# Patient Record
Sex: MALE | Race: WHITE | NOT HISPANIC OR LATINO
[De-identification: names, ages, dates, MRNs, and addresses within clinical notes are randomized per-mention and may not be internally consistent; named-entity substitution may affect disease eponyms.]

---

## 2017-01-03 ENCOUNTER — APPOINTMENT (OUTPATIENT)
Dept: CARDIOLOGY | Facility: CLINIC | Age: 62
End: 2017-01-03

## 2017-01-26 ENCOUNTER — APPOINTMENT (OUTPATIENT)
Dept: CARDIOLOGY | Facility: CLINIC | Age: 62
End: 2017-01-26

## 2017-01-30 ENCOUNTER — APPOINTMENT (OUTPATIENT)
Dept: CARDIOLOGY | Facility: CLINIC | Age: 62
End: 2017-01-30

## 2017-02-15 ENCOUNTER — EMERGENCY (EMERGENCY)
Facility: HOSPITAL | Age: 62
LOS: 1 days | End: 2017-02-15
Payer: COMMERCIAL

## 2017-02-15 PROCEDURE — 71010: CPT | Mod: 26

## 2017-02-15 PROCEDURE — 27786 TREATMENT OF ANKLE FRACTURE: CPT | Mod: 54

## 2017-02-15 PROCEDURE — 73610 X-RAY EXAM OF ANKLE: CPT | Mod: 26,LT

## 2017-02-15 PROCEDURE — 73630 X-RAY EXAM OF FOOT: CPT | Mod: 26,LT

## 2017-02-15 PROCEDURE — 99284 EMERGENCY DEPT VISIT MOD MDM: CPT | Mod: 25

## 2017-04-04 ENCOUNTER — MEDICATION RENEWAL (OUTPATIENT)
Age: 62
End: 2017-04-04

## 2017-04-28 ENCOUNTER — OUTPATIENT (OUTPATIENT)
Dept: OUTPATIENT SERVICES | Facility: HOSPITAL | Age: 62
LOS: 1 days | End: 2017-04-28

## 2017-07-25 ENCOUNTER — RX RENEWAL (OUTPATIENT)
Age: 62
End: 2017-07-25

## 2017-08-14 ENCOUNTER — APPOINTMENT (OUTPATIENT)
Dept: CARDIOLOGY | Facility: CLINIC | Age: 62
End: 2017-08-14

## 2017-11-17 ENCOUNTER — RX RENEWAL (OUTPATIENT)
Age: 62
End: 2017-11-17

## 2018-06-12 ENCOUNTER — RX RENEWAL (OUTPATIENT)
Age: 63
End: 2018-06-12

## 2018-11-26 ENCOUNTER — OUTPATIENT (OUTPATIENT)
Dept: OUTPATIENT SERVICES | Facility: HOSPITAL | Age: 63
LOS: 1 days | End: 2018-11-26
Payer: COMMERCIAL

## 2018-11-26 PROCEDURE — 70553 MRI BRAIN STEM W/O & W/DYE: CPT | Mod: 26

## 2018-12-26 ENCOUNTER — APPOINTMENT (OUTPATIENT)
Dept: CARDIOLOGY | Facility: CLINIC | Age: 63
End: 2018-12-26

## 2018-12-26 DIAGNOSIS — H91.92 UNSPECIFIED HEARING LOSS, LEFT EAR: ICD-10-CM

## 2018-12-26 DIAGNOSIS — Z86.61 PERSONAL HISTORY OF INFECTIONS OF THE CENTRAL NERVOUS SYSTEM: ICD-10-CM

## 2018-12-26 DIAGNOSIS — Z82.3 FAMILY HISTORY OF STROKE: ICD-10-CM

## 2018-12-26 DIAGNOSIS — Z82.49 FAMILY HISTORY OF ISCHEMIC HEART DISEASE AND OTHER DISEASES OF THE CIRCULATORY SYSTEM: ICD-10-CM

## 2018-12-26 DIAGNOSIS — Z78.9 OTHER SPECIFIED HEALTH STATUS: ICD-10-CM

## 2018-12-26 DIAGNOSIS — Z87.891 PERSONAL HISTORY OF NICOTINE DEPENDENCE: ICD-10-CM

## 2018-12-31 ENCOUNTER — APPOINTMENT (OUTPATIENT)
Dept: CARDIOLOGY | Facility: CLINIC | Age: 63
End: 2018-12-31
Payer: COMMERCIAL

## 2018-12-31 ENCOUNTER — NON-APPOINTMENT (OUTPATIENT)
Age: 63
End: 2018-12-31

## 2018-12-31 VITALS
BODY MASS INDEX: 34.1 KG/M2 | WEIGHT: 225 LBS | RESPIRATION RATE: 14 BRPM | OXYGEN SATURATION: 95 % | HEIGHT: 68 IN | DIASTOLIC BLOOD PRESSURE: 80 MMHG | HEART RATE: 82 BPM | SYSTOLIC BLOOD PRESSURE: 120 MMHG

## 2018-12-31 PROCEDURE — 99213 OFFICE O/P EST LOW 20 MIN: CPT

## 2018-12-31 PROCEDURE — 93000 ELECTROCARDIOGRAM COMPLETE: CPT

## 2018-12-31 NOTE — REVIEW OF SYSTEMS
[Feeling Fatigued] : feeling fatigued [Heartburn] : heartburn [see HPI] : see HPI [Dizziness] : dizziness [Negative] : Heme/Lymph

## 2019-01-03 NOTE — ASSESSMENT
[FreeTextEntry1] : To review, Camden is a pleasant 63-year-old male with the following active issues.\par Diabetes mellitus: Reports last hemoglobin A1c was 6.7. Follow with PMD.\par Dizziness: 24 hour Holter monitor to ensure no significant arrhythmias.  \par Fatigue: Echocardiogram to assure no significant cardiomyopathy or worsening cardiomyopathy. Patient also has auscultated systolic murmur.\par Exercise stress test to evaluate for any exercise-induced arrhythmias with history of dizziness.\par Obesity: Lifestyle measures to include increasing exercise and decreasing caloric intake discussed.

## 2019-01-03 NOTE — REASON FOR VISIT
[Follow-Up - Clinic] : a clinic follow-up of [FreeTextEntry2] : CV evaluation for fatigue and dizziness.  [FreeTextEntry1] : Camden is a very pleasant 63-year-old male that presents today for yearly cardiovascular followup. Since last being seen has been no hospitalizations. Recently started on Janumet and Farxiga for elevated hemoglobin A1c.  Recent labs by PMD are not available for me to review at this time.\par States episodes of significant fatigue and dizziness several weeks ago. Lasted for approximately 1-1/2 weeks. Constant. Denies any other symptoms to include chest pain, shortness of breath, palpitations, syncope or near syncope. Denies any edema, PND, or orthopnea.\par He does have a history of mild TR.  Denies edema.\par Patient states that he is minimally active. He does not exercise. He is active with ADLs but does not exert himself.\par Patient brought his list of medications, does not list any dosages. Advised patient he needs to bring all his meds to next office visit.\par

## 2019-01-03 NOTE — PHYSICAL EXAM
[General Appearance - Well Developed] : well developed [Normal Appearance] : normal appearance [Well Groomed] : well groomed [General Appearance - Well Nourished] : well nourished [No Deformities] : no deformities [General Appearance - In No Acute Distress] : no acute distress [Normal Conjunctiva] : the conjunctiva exhibited no abnormalities [Eyelids - No Xanthelasma] : the eyelids demonstrated no xanthelasmas [No Oral Pallor] : no oral pallor [No Oral Cyanosis] : no oral cyanosis [Normal Jugular Venous A Waves Present] : normal jugular venous A waves present [Normal Jugular Venous V Waves Present] : normal jugular venous V waves present [No Jugular Venous Irving A Waves] : no jugular venous irving A waves [Respiration, Rhythm And Depth] : normal respiratory rhythm and effort [Exaggerated Use Of Accessory Muscles For Inspiration] : no accessory muscle use [Auscultation Breath Sounds / Voice Sounds] : lungs were clear to auscultation bilaterally [Heart Rate And Rhythm] : heart rate and rhythm were normal [Heart Sounds] : normal S1 and S2 [Abdomen Soft] : soft [Abnormal Walk] : normal gait [Gait - Sufficient For Exercise Testing] : the gait was sufficient for exercise testing [Nail Clubbing] : no clubbing of the fingernails [Cyanosis, Localized] : no localized cyanosis [Petechial Hemorrhages (___cm)] : no petechial hemorrhages [Skin Color & Pigmentation] : normal skin color and pigmentation [] : no rash [No Venous Stasis] : no venous stasis [Skin Lesions] : no skin lesions [No Skin Ulcers] : no skin ulcer [No Xanthoma] : no  xanthoma was observed [Oriented To Time, Place, And Person] : oriented to person, place, and time [Affect] : the affect was normal [Mood] : the mood was normal [No Anxiety] : not feeling anxious [FreeTextEntry1] : Globus

## 2019-01-18 ENCOUNTER — OUTPATIENT (OUTPATIENT)
Dept: OUTPATIENT SERVICES | Facility: HOSPITAL | Age: 64
LOS: 1 days | End: 2019-01-18
Payer: COMMERCIAL

## 2019-01-18 PROCEDURE — 76700 US EXAM ABDOM COMPLETE: CPT | Mod: 26

## 2019-01-22 ENCOUNTER — APPOINTMENT (OUTPATIENT)
Dept: CARDIOLOGY | Facility: CLINIC | Age: 64
End: 2019-01-22
Payer: COMMERCIAL

## 2019-01-22 PROCEDURE — 93015 CV STRESS TEST SUPVJ I&R: CPT

## 2019-01-22 PROCEDURE — 93306 TTE W/DOPPLER COMPLETE: CPT

## 2019-01-23 PROCEDURE — 93224 XTRNL ECG REC UP TO 48 HRS: CPT

## 2019-01-25 ENCOUNTER — OUTPATIENT (OUTPATIENT)
Dept: OUTPATIENT SERVICES | Facility: HOSPITAL | Age: 64
LOS: 1 days | End: 2019-01-25
Payer: COMMERCIAL

## 2019-01-25 PROCEDURE — 76770 US EXAM ABDO BACK WALL COMP: CPT | Mod: 26

## 2019-01-25 PROCEDURE — 76856 US EXAM PELVIC COMPLETE: CPT | Mod: 26

## 2019-01-28 ENCOUNTER — APPOINTMENT (OUTPATIENT)
Dept: CARDIOLOGY | Facility: CLINIC | Age: 64
End: 2019-01-28
Payer: COMMERCIAL

## 2019-01-28 VITALS
DIASTOLIC BLOOD PRESSURE: 60 MMHG | OXYGEN SATURATION: 97 % | HEIGHT: 68 IN | BODY MASS INDEX: 32.13 KG/M2 | WEIGHT: 212 LBS | SYSTOLIC BLOOD PRESSURE: 110 MMHG | HEART RATE: 70 BPM

## 2019-01-28 PROCEDURE — 99215 OFFICE O/P EST HI 40 MIN: CPT

## 2019-02-12 ENCOUNTER — APPOINTMENT (OUTPATIENT)
Dept: CARDIOLOGY | Facility: CLINIC | Age: 64
End: 2019-02-12
Payer: COMMERCIAL

## 2019-02-12 PROBLEM — Z86.61 HISTORY OF MENINGITIS: Status: RESOLVED | Noted: 2019-02-12 | Resolved: 2019-02-12

## 2019-02-12 PROBLEM — Z87.891 FORMER SMOKER: Status: ACTIVE | Noted: 2019-02-12

## 2019-02-12 PROBLEM — Z82.3 FAMILY HISTORY OF CEREBROVASCULAR ACCIDENT (CVA): Status: ACTIVE | Noted: 2019-02-12

## 2019-02-12 PROBLEM — Z82.49 FAMILY HISTORY OF HEART FAILURE: Status: ACTIVE | Noted: 2019-02-12

## 2019-02-12 PROBLEM — H91.92 DEAFNESS IN LEFT EAR: Status: RESOLVED | Noted: 2019-02-12 | Resolved: 2019-02-12

## 2019-02-12 PROBLEM — Z78.9 RARELY CONSUMES ALCOHOL: Status: ACTIVE | Noted: 2019-02-12

## 2019-02-12 PROCEDURE — 78452 HT MUSCLE IMAGE SPECT MULT: CPT

## 2019-02-12 PROCEDURE — A9502: CPT

## 2019-02-12 PROCEDURE — 93015 CV STRESS TEST SUPVJ I&R: CPT

## 2019-02-19 ENCOUNTER — APPOINTMENT (OUTPATIENT)
Dept: CARDIOLOGY | Facility: CLINIC | Age: 64
End: 2019-02-19
Payer: COMMERCIAL

## 2019-02-19 VITALS
DIASTOLIC BLOOD PRESSURE: 70 MMHG | HEIGHT: 68 IN | BODY MASS INDEX: 31.07 KG/M2 | WEIGHT: 205 LBS | SYSTOLIC BLOOD PRESSURE: 120 MMHG | HEART RATE: 76 BPM

## 2019-02-19 DIAGNOSIS — Z00.00 ENCOUNTER FOR GENERAL ADULT MEDICAL EXAMINATION W/OUT ABNORMAL FINDINGS: ICD-10-CM

## 2019-02-19 PROCEDURE — 99215 OFFICE O/P EST HI 40 MIN: CPT

## 2019-02-19 NOTE — REASON FOR VISIT
[Follow-Up - Clinic] : a clinic follow-up of [FreeTextEntry2] : CV evaluation for fatigue and dizziness.  [FreeTextEntry1] : \par

## 2019-02-19 NOTE — HISTORY OF PRESENT ILLNESS
[FreeTextEntry1] : Mr. Polo is a very pleasant 63-year-old gentleman, who came for cardiac testing follow up.\par \par 1/22/19  Echo- Hypokinetic basal inferior and Inferolateral walls; overall LVEF 60 %\par \par 2/12/19  Exercise nuclear stress test showed small fixed basal inferior defect\par \par \par The patient had an episode of right upper quadrant pain but no real chest pain- completely resolved.  He does not exercise per se but fairly active.  \par \par He does not get any exertional chest pain.  He denies PND, orthopnea, diaphoresis, dizziness, palpitations, or pedal edema.  No prior history of CHF, MI, or syncope.  He states he is compliant to medications and low-cholesterol diet, although the patient did not know the name of his medication today.\par \par CAD RISK FACTORS:\par CAD risk factors include his age, male sex, diabetes, hypertension, and dyslipidemia.\par

## 2019-02-19 NOTE — PHYSICAL EXAM
[General Appearance - Well Developed] : well developed [Normal Appearance] : normal appearance [Well Groomed] : well groomed [General Appearance - Well Nourished] : well nourished [No Deformities] : no deformities [General Appearance - In No Acute Distress] : no acute distress [Normal Conjunctiva] : the conjunctiva exhibited no abnormalities [Eyelids - No Xanthelasma] : the eyelids demonstrated no xanthelasmas [No Oral Pallor] : no oral pallor [No Oral Cyanosis] : no oral cyanosis [Normal Jugular Venous A Waves Present] : normal jugular venous A waves present [Normal Jugular Venous V Waves Present] : normal jugular venous V waves present [No Jugular Venous Irving A Waves] : no jugular venous irving A waves [Respiration, Rhythm And Depth] : normal respiratory rhythm and effort [Exaggerated Use Of Accessory Muscles For Inspiration] : no accessory muscle use [Auscultation Breath Sounds / Voice Sounds] : lungs were clear to auscultation bilaterally [Heart Rate And Rhythm] : heart rate and rhythm were normal [Heart Sounds] : normal S1 and S2 [Abdomen Soft] : soft [FreeTextEntry1] : Globus [Abnormal Walk] : normal gait [Gait - Sufficient For Exercise Testing] : the gait was sufficient for exercise testing [Nail Clubbing] : no clubbing of the fingernails [Cyanosis, Localized] : no localized cyanosis [Petechial Hemorrhages (___cm)] : no petechial hemorrhages [Skin Color & Pigmentation] : normal skin color and pigmentation [] : no rash [No Venous Stasis] : no venous stasis [Skin Lesions] : no skin lesions [No Skin Ulcers] : no skin ulcer [No Xanthoma] : no  xanthoma was observed [Oriented To Time, Place, And Person] : oriented to person, place, and time [Affect] : the affect was normal [Mood] : the mood was normal [No Anxiety] : not feeling anxious

## 2019-02-19 NOTE — ASSESSMENT
[FreeTextEntry1] : Shortness of breath.  Multiple CAD risk factors.  The patient had regular stress test which showed lateral ST depression.  Myocardial perfusion scan showed fixed basal inferior defect s/o infarct ( patient also had diaphgramatic attenuation ); patient wants to confirm CAD, he is asymptomatic; I have requested CTA heart for coronary evaluation.\par \par Diabetes.  Long-term goals of blood pressure less than 130/80, A1c less than 7, and LDL less than 70; diabetic diet; continue medications.\par \par Hypertension.  Low-salt diet, continue current medication.\par \par Dyslipidemia.  Low-cholesterol diet, continue current medication.\par \par Suspected severe sleep apnea syndrome.  Consider outpatient sleep study.\par \par Obesity.  Weight reduction by diet and exercise.\par \par Aggressive risk factor modification has been discussed with the patient at a great length.  He will be reevaluated by me after cardiac testing.\par

## 2019-02-27 ENCOUNTER — OUTPATIENT (OUTPATIENT)
Dept: OUTPATIENT SERVICES | Facility: HOSPITAL | Age: 64
LOS: 1 days | End: 2019-02-27
Payer: COMMERCIAL

## 2019-02-27 DIAGNOSIS — R42 DIZZINESS AND GIDDINESS: ICD-10-CM

## 2019-02-27 DIAGNOSIS — K21.9 GASTRO-ESOPHAGEAL REFLUX DISEASE WITHOUT ESOPHAGITIS: ICD-10-CM

## 2019-02-27 LAB
ANION GAP SERPL CALC-SCNC: 10 MMOL/L — SIGNIFICANT CHANGE UP (ref 5–17)
BUN SERPL-MCNC: 15 MG/DL — SIGNIFICANT CHANGE UP (ref 8–20)
CALCIUM SERPL-MCNC: 9.7 MG/DL — SIGNIFICANT CHANGE UP (ref 8.6–10.2)
CHLORIDE SERPL-SCNC: 101 MMOL/L — SIGNIFICANT CHANGE UP (ref 98–107)
CO2 SERPL-SCNC: 27 MMOL/L — SIGNIFICANT CHANGE UP (ref 22–29)
CREAT SERPL-MCNC: 0.98 MG/DL — SIGNIFICANT CHANGE UP (ref 0.5–1.3)
GLUCOSE SERPL-MCNC: 110 MG/DL — SIGNIFICANT CHANGE UP (ref 70–115)
POTASSIUM SERPL-MCNC: 4.7 MMOL/L — SIGNIFICANT CHANGE UP (ref 3.5–5.3)
POTASSIUM SERPL-SCNC: 4.7 MMOL/L — SIGNIFICANT CHANGE UP (ref 3.5–5.3)
SODIUM SERPL-SCNC: 138 MMOL/L — SIGNIFICANT CHANGE UP (ref 135–145)

## 2019-02-27 PROCEDURE — 75571 CT HRT W/O DYE W/CA TEST: CPT | Mod: 26

## 2019-02-27 PROCEDURE — 80048 BASIC METABOLIC PNL TOTAL CA: CPT

## 2019-02-27 PROCEDURE — 75571 CT HRT W/O DYE W/CA TEST: CPT

## 2019-02-27 PROCEDURE — 36415 COLL VENOUS BLD VENIPUNCTURE: CPT

## 2019-03-01 ENCOUNTER — OUTPATIENT (OUTPATIENT)
Dept: OUTPATIENT SERVICES | Facility: HOSPITAL | Age: 64
LOS: 1 days | End: 2019-03-01
Payer: COMMERCIAL

## 2019-03-01 PROCEDURE — 93010 ELECTROCARDIOGRAM REPORT: CPT

## 2019-03-01 PROCEDURE — 93458 L HRT ARTERY/VENTRICLE ANGIO: CPT | Mod: 26

## 2019-03-04 ENCOUNTER — APPOINTMENT (OUTPATIENT)
Dept: CARDIOLOGY | Facility: CLINIC | Age: 64
End: 2019-03-04
Payer: COMMERCIAL

## 2019-03-04 VITALS
SYSTOLIC BLOOD PRESSURE: 110 MMHG | HEART RATE: 70 BPM | OXYGEN SATURATION: 98 % | WEIGHT: 205 LBS | BODY MASS INDEX: 31.07 KG/M2 | HEIGHT: 68 IN | DIASTOLIC BLOOD PRESSURE: 64 MMHG

## 2019-03-04 PROCEDURE — 99214 OFFICE O/P EST MOD 30 MIN: CPT

## 2019-03-04 RX ORDER — SIMVASTATIN 40 MG/1
40 TABLET, FILM COATED ORAL
Refills: 0 | Status: DISCONTINUED | COMMUNITY
End: 2019-03-04

## 2019-03-07 ENCOUNTER — APPOINTMENT (OUTPATIENT)
Dept: CARDIOLOGY | Facility: CLINIC | Age: 64
End: 2019-03-07

## 2019-03-20 ENCOUNTER — APPOINTMENT (OUTPATIENT)
Dept: CARDIOLOGY | Facility: CLINIC | Age: 64
End: 2019-03-20
Payer: COMMERCIAL

## 2019-03-20 VITALS
WEIGHT: 199 LBS | HEART RATE: 59 BPM | DIASTOLIC BLOOD PRESSURE: 70 MMHG | HEIGHT: 68 IN | BODY MASS INDEX: 30.16 KG/M2 | OXYGEN SATURATION: 98 % | SYSTOLIC BLOOD PRESSURE: 110 MMHG

## 2019-03-20 PROCEDURE — 99215 OFFICE O/P EST HI 40 MIN: CPT

## 2019-05-16 ENCOUNTER — OUTPATIENT (OUTPATIENT)
Dept: OUTPATIENT SERVICES | Facility: HOSPITAL | Age: 64
LOS: 1 days | End: 2019-05-16

## 2019-06-24 ENCOUNTER — APPOINTMENT (OUTPATIENT)
Dept: CARDIOLOGY | Facility: CLINIC | Age: 64
End: 2019-06-24
Payer: MEDICARE

## 2019-06-24 VITALS
WEIGHT: 202 LBS | HEIGHT: 68 IN | HEART RATE: 62 BPM | SYSTOLIC BLOOD PRESSURE: 102 MMHG | DIASTOLIC BLOOD PRESSURE: 60 MMHG | OXYGEN SATURATION: 98 % | BODY MASS INDEX: 30.62 KG/M2

## 2019-06-24 PROCEDURE — 99214 OFFICE O/P EST MOD 30 MIN: CPT

## 2019-07-22 ENCOUNTER — OUTPATIENT (OUTPATIENT)
Dept: OUTPATIENT SERVICES | Facility: HOSPITAL | Age: 64
LOS: 1 days | End: 2019-07-22

## 2019-07-29 ENCOUNTER — APPOINTMENT (OUTPATIENT)
Dept: CARDIOLOGY | Facility: CLINIC | Age: 64
End: 2019-07-29

## 2019-08-14 ENCOUNTER — OUTPATIENT (OUTPATIENT)
Dept: OUTPATIENT SERVICES | Facility: HOSPITAL | Age: 64
LOS: 1 days | End: 2019-08-14

## 2019-12-31 ENCOUNTER — RECORD ABSTRACTING (OUTPATIENT)
Age: 64
End: 2019-12-31

## 2020-01-02 ENCOUNTER — APPOINTMENT (OUTPATIENT)
Dept: CARDIOLOGY | Facility: CLINIC | Age: 65
End: 2020-01-02
Payer: MEDICARE

## 2020-01-02 VITALS
SYSTOLIC BLOOD PRESSURE: 120 MMHG | DIASTOLIC BLOOD PRESSURE: 80 MMHG | OXYGEN SATURATION: 97 % | HEART RATE: 85 BPM | BODY MASS INDEX: 33.34 KG/M2 | HEIGHT: 68 IN | WEIGHT: 220 LBS

## 2020-01-02 DIAGNOSIS — Z86.79 PERSONAL HISTORY OF OTHER DISEASES OF THE CIRCULATORY SYSTEM: ICD-10-CM

## 2020-01-02 PROCEDURE — 93000 ELECTROCARDIOGRAM COMPLETE: CPT

## 2020-01-02 PROCEDURE — 99214 OFFICE O/P EST MOD 30 MIN: CPT

## 2020-01-02 NOTE — HISTORY OF PRESENT ILLNESS
[FreeTextEntry1] : Mr. Polo is a very pleasant 64 -year-old gentleman, who came for cardiac   follow up.\par \par The patient denies any chest pain.  He does not exercise per se but fairly active.  \par \par He does not get any exertional chest pain.  He denies PND, orthopnea, diaphoresis, dizziness, palpitations, or pedal edema.  No prior history of CHF, MI, or syncope.  He states he is compliant to medications and low-cholesterol diet, although the patient did not know the name of his medication today.\par \par CAD RISK FACTORS:\par CAD risk factors include his age, male sex, diabetes, hypertension, and dyslipidemia.\par

## 2020-01-02 NOTE — REVIEW OF SYSTEMS
[Heartburn] : heartburn [Feeling Fatigued] : feeling fatigued [see HPI] : see HPI [Dizziness] : dizziness [Negative] : Endocrine

## 2020-01-02 NOTE — ASSESSMENT
[FreeTextEntry1] : Shortness of breath.  patient had  CTA heart followed by cardiac cath as above, did not need any intervention; he is asymptomatic.No evidence of ACs or CHF.\par \par Diabetes.  Long-term goals of blood pressure less than 130/80, A1c less than 7, and LDL less than 70; diabetic diet; continue medications.\par \par Hypertension.  Low-salt diet, continue current medication.\par \par Dyslipidemia.  Low-cholesterol diet, continue current medication.Lipid panel in 6 months.\par \par Suspected severe sleep apnea syndrome.  Outpatient sleep study.\par \par Obesity.  Weight reduction by diet and exercise.\par \par Aortosclerosis- Abd US to r/o AAA\par \par Aggressive risk factor modification has been discussed with the patient at a great length.  He will be reevaluated by me in 6 months..

## 2020-01-02 NOTE — PHYSICAL EXAM
[Normal Appearance] : normal appearance [General Appearance - Well Developed] : well developed [Well Groomed] : well groomed [No Deformities] : no deformities [General Appearance - Well Nourished] : well nourished [General Appearance - In No Acute Distress] : no acute distress [Normal Conjunctiva] : the conjunctiva exhibited no abnormalities [Eyelids - No Xanthelasma] : the eyelids demonstrated no xanthelasmas [No Oral Pallor] : no oral pallor [No Oral Cyanosis] : no oral cyanosis [Normal Jugular Venous V Waves Present] : normal jugular venous V waves present [Normal Jugular Venous A Waves Present] : normal jugular venous A waves present [No Jugular Venous Irving A Waves] : no jugular venous irving A waves [Respiration, Rhythm And Depth] : normal respiratory rhythm and effort [Exaggerated Use Of Accessory Muscles For Inspiration] : no accessory muscle use [Auscultation Breath Sounds / Voice Sounds] : lungs were clear to auscultation bilaterally [Heart Rate And Rhythm] : heart rate and rhythm were normal [Heart Sounds] : normal S1 and S2 [Abdomen Soft] : soft [Abnormal Walk] : normal gait [Gait - Sufficient For Exercise Testing] : the gait was sufficient for exercise testing [Nail Clubbing] : no clubbing of the fingernails [Cyanosis, Localized] : no localized cyanosis [Petechial Hemorrhages (___cm)] : no petechial hemorrhages [Skin Color & Pigmentation] : normal skin color and pigmentation [] : no rash [No Venous Stasis] : no venous stasis [Skin Lesions] : no skin lesions [No Skin Ulcers] : no skin ulcer [No Xanthoma] : no  xanthoma was observed [Mood] : the mood was normal [Affect] : the affect was normal [Oriented To Time, Place, And Person] : oriented to person, place, and time [No Anxiety] : not feeling anxious [FreeTextEntry1] : Globus

## 2020-01-02 NOTE — REASON FOR VISIT
[Follow-Up - Clinic] : a clinic follow-up of [FreeTextEntry1] : \par  [FreeTextEntry2] : CV evaluation for fatigue and dizziness.

## 2020-04-22 ENCOUNTER — OUTPATIENT (OUTPATIENT)
Dept: OUTPATIENT SERVICES | Facility: HOSPITAL | Age: 65
LOS: 1 days | End: 2020-04-22

## 2020-07-20 ENCOUNTER — OUTPATIENT (OUTPATIENT)
Dept: OUTPATIENT SERVICES | Facility: HOSPITAL | Age: 65
LOS: 1 days | End: 2020-07-20

## 2020-07-27 ENCOUNTER — NON-APPOINTMENT (OUTPATIENT)
Age: 65
End: 2020-07-27

## 2020-07-27 ENCOUNTER — APPOINTMENT (OUTPATIENT)
Dept: CARDIOLOGY | Facility: CLINIC | Age: 65
End: 2020-07-27
Payer: MEDICARE

## 2020-07-27 VITALS
OXYGEN SATURATION: 97 % | HEART RATE: 80 BPM | WEIGHT: 220 LBS | DIASTOLIC BLOOD PRESSURE: 86 MMHG | BODY MASS INDEX: 33.45 KG/M2 | SYSTOLIC BLOOD PRESSURE: 118 MMHG | TEMPERATURE: 97.5 F

## 2020-07-27 PROCEDURE — 99214 OFFICE O/P EST MOD 30 MIN: CPT

## 2020-07-27 PROCEDURE — 93000 ELECTROCARDIOGRAM COMPLETE: CPT

## 2020-11-18 ENCOUNTER — OUTPATIENT (OUTPATIENT)
Dept: OUTPATIENT SERVICES | Facility: HOSPITAL | Age: 65
LOS: 1 days | End: 2020-11-18
Payer: MEDICARE

## 2020-11-18 PROCEDURE — 71250 CT THORAX DX C-: CPT | Mod: 26

## 2020-12-30 ENCOUNTER — NON-APPOINTMENT (OUTPATIENT)
Age: 65
End: 2020-12-30

## 2020-12-31 ENCOUNTER — APPOINTMENT (OUTPATIENT)
Dept: CARDIOLOGY | Facility: CLINIC | Age: 65
End: 2020-12-31
Payer: MEDICARE

## 2020-12-31 VITALS
TEMPERATURE: 96.9 F | BODY MASS INDEX: 33.76 KG/M2 | SYSTOLIC BLOOD PRESSURE: 112 MMHG | WEIGHT: 222 LBS | HEART RATE: 79 BPM | OXYGEN SATURATION: 99 % | DIASTOLIC BLOOD PRESSURE: 74 MMHG

## 2020-12-31 PROCEDURE — 99214 OFFICE O/P EST MOD 30 MIN: CPT

## 2021-01-21 ENCOUNTER — APPOINTMENT (OUTPATIENT)
Dept: CARDIOLOGY | Facility: CLINIC | Age: 66
End: 2021-01-21
Payer: MEDICARE

## 2021-01-21 VITALS
HEIGHT: 68 IN | SYSTOLIC BLOOD PRESSURE: 130 MMHG | WEIGHT: 222 LBS | HEART RATE: 68 BPM | OXYGEN SATURATION: 95 % | DIASTOLIC BLOOD PRESSURE: 80 MMHG | BODY MASS INDEX: 33.65 KG/M2 | TEMPERATURE: 97.7 F

## 2021-01-21 PROCEDURE — 93306 TTE W/DOPPLER COMPLETE: CPT

## 2021-01-21 PROCEDURE — 99214 OFFICE O/P EST MOD 30 MIN: CPT

## 2021-01-21 RX ORDER — AMLODIPINE BESYLATE AND BENAZEPRIL HYDROCHLORIDE 5; 10 MG/1; MG/1
5-10 CAPSULE ORAL DAILY
Refills: 0 | Status: DISCONTINUED | COMMUNITY
End: 2021-01-21

## 2021-01-25 ENCOUNTER — APPOINTMENT (OUTPATIENT)
Dept: CARDIOLOGY | Facility: CLINIC | Age: 66
End: 2021-01-25

## 2021-02-08 ENCOUNTER — RX CHANGE (OUTPATIENT)
Age: 66
End: 2021-02-08

## 2021-05-18 ENCOUNTER — APPOINTMENT (OUTPATIENT)
Dept: CARDIOLOGY | Facility: CLINIC | Age: 66
End: 2021-05-18

## 2021-07-15 ENCOUNTER — EMERGENCY (EMERGENCY)
Facility: HOSPITAL | Age: 66
LOS: 1 days | End: 2021-07-15
Admitting: EMERGENCY MEDICINE
Payer: MEDICARE

## 2021-07-15 PROCEDURE — 70450 CT HEAD/BRAIN W/O DYE: CPT | Mod: 26

## 2021-07-15 PROCEDURE — 99284 EMERGENCY DEPT VISIT MOD MDM: CPT

## 2021-12-18 ENCOUNTER — INPATIENT (INPATIENT)
Facility: HOSPITAL | Age: 66
LOS: 1 days | Discharge: HOME | End: 2021-12-20
Admitting: STUDENT IN AN ORGANIZED HEALTH CARE EDUCATION/TRAINING PROGRAM
Payer: MEDICARE

## 2021-12-18 ENCOUNTER — OUTPATIENT (OUTPATIENT)
Dept: OUTPATIENT SERVICES | Facility: HOSPITAL | Age: 66
LOS: 1 days | End: 2021-12-18

## 2021-12-18 PROCEDURE — 71045 X-RAY EXAM CHEST 1 VIEW: CPT | Mod: 26

## 2021-12-18 PROCEDURE — 93010 ELECTROCARDIOGRAM REPORT: CPT

## 2021-12-18 PROCEDURE — 99285 EMERGENCY DEPT VISIT HI MDM: CPT

## 2021-12-19 ENCOUNTER — OUTPATIENT (OUTPATIENT)
Dept: OUTPATIENT SERVICES | Facility: HOSPITAL | Age: 66
LOS: 1 days | End: 2021-12-19

## 2021-12-20 ENCOUNTER — OUTPATIENT (OUTPATIENT)
Dept: OUTPATIENT SERVICES | Facility: HOSPITAL | Age: 66
LOS: 1 days | End: 2021-12-20

## 2021-12-21 ENCOUNTER — INPATIENT (INPATIENT)
Facility: HOSPITAL | Age: 66
LOS: 5 days | Discharge: HOME HEALTH | End: 2021-12-27
Attending: STUDENT IN AN ORGANIZED HEALTH CARE EDUCATION/TRAINING PROGRAM
Payer: MEDICARE

## 2021-12-21 ENCOUNTER — OUTPATIENT (OUTPATIENT)
Dept: OUTPATIENT SERVICES | Facility: HOSPITAL | Age: 66
LOS: 1 days | End: 2021-12-21

## 2021-12-21 PROCEDURE — 71045 X-RAY EXAM CHEST 1 VIEW: CPT | Mod: 26

## 2021-12-21 PROCEDURE — 99284 EMERGENCY DEPT VISIT MOD MDM: CPT

## 2021-12-21 PROCEDURE — 93010 ELECTROCARDIOGRAM REPORT: CPT

## 2021-12-22 ENCOUNTER — OUTPATIENT (OUTPATIENT)
Dept: OUTPATIENT SERVICES | Facility: HOSPITAL | Age: 66
LOS: 1 days | End: 2021-12-22

## 2021-12-23 ENCOUNTER — OUTPATIENT (OUTPATIENT)
Dept: OUTPATIENT SERVICES | Facility: HOSPITAL | Age: 66
LOS: 1 days | End: 2021-12-23

## 2021-12-23 PROCEDURE — 74177 CT ABD & PELVIS W/CONTRAST: CPT | Mod: 26

## 2021-12-24 ENCOUNTER — OUTPATIENT (OUTPATIENT)
Dept: OUTPATIENT SERVICES | Facility: HOSPITAL | Age: 66
LOS: 1 days | End: 2021-12-24

## 2021-12-25 ENCOUNTER — OUTPATIENT (OUTPATIENT)
Dept: OUTPATIENT SERVICES | Facility: HOSPITAL | Age: 66
LOS: 1 days | End: 2021-12-25

## 2021-12-26 ENCOUNTER — OUTPATIENT (OUTPATIENT)
Dept: OUTPATIENT SERVICES | Facility: HOSPITAL | Age: 66
LOS: 1 days | End: 2021-12-26

## 2021-12-26 PROCEDURE — 72196 MRI PELVIS W/DYE: CPT | Mod: 26

## 2021-12-27 ENCOUNTER — OUTPATIENT (OUTPATIENT)
Dept: OUTPATIENT SERVICES | Facility: HOSPITAL | Age: 66
LOS: 1 days | End: 2021-12-27

## 2022-01-21 ENCOUNTER — EMERGENCY (EMERGENCY)
Facility: HOSPITAL | Age: 67
LOS: 1 days | Discharge: HOME | End: 2022-01-21
Admitting: EMERGENCY MEDICINE
Payer: MEDICARE

## 2022-01-21 PROCEDURE — 70450 CT HEAD/BRAIN W/O DYE: CPT | Mod: 26

## 2022-01-21 PROCEDURE — 12011 RPR F/E/E/N/L/M 2.5 CM/<: CPT

## 2022-01-21 PROCEDURE — 70486 CT MAXILLOFACIAL W/O DYE: CPT | Mod: 26

## 2022-01-21 PROCEDURE — 99284 EMERGENCY DEPT VISIT MOD MDM: CPT | Mod: 25

## 2022-01-22 ENCOUNTER — EMERGENCY (EMERGENCY)
Facility: HOSPITAL | Age: 67
LOS: 1 days | Discharge: HOME | End: 2022-01-22
Payer: MEDICARE
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
## 2022-01-22 DIAGNOSIS — I10 ESSENTIAL (PRIMARY) HYPERTENSION: ICD-10-CM

## 2022-01-22 DIAGNOSIS — Z87.891 PERSONAL HISTORY OF NICOTINE DEPENDENCE: ICD-10-CM

## 2022-01-22 DIAGNOSIS — S02.2XXA FRACTURE OF NASAL BONES, INITIAL ENCOUNTER FOR CLOSED FRACTURE: ICD-10-CM
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
## 2022-01-22 DIAGNOSIS — R55 SYNCOPE AND COLLAPSE: ICD-10-CM
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
## 2022-01-22 DIAGNOSIS — S09.8XXA OTHER SPECIFIED INJURIES OF HEAD, INITIAL ENCOUNTER: ICD-10-CM

## 2022-01-22 DIAGNOSIS — E66.9 OBESITY, UNSPECIFIED: ICD-10-CM

## 2022-01-22 DIAGNOSIS — Z79.82 LONG TERM (CURRENT) USE OF ASPIRIN: ICD-10-CM

## 2022-01-22 DIAGNOSIS — Z79.84 LONG TERM (CURRENT) USE OF ORAL HYPOGLYCEMIC DRUGS: ICD-10-CM

## 2022-01-22 DIAGNOSIS — I25.10 ATHEROSCLEROTIC HEART DISEASE OF NATIVE CORONARY ARTERY WITHOUT ANGINA PECTORIS: ICD-10-CM

## 2022-01-22 DIAGNOSIS — K21.9 GASTRO-ESOPHAGEAL REFLUX DISEASE WITHOUT ESOPHAGITIS: ICD-10-CM

## 2022-01-22 DIAGNOSIS — E11.9 TYPE 2 DIABETES MELLITUS WITHOUT COMPLICATIONS: ICD-10-CM

## 2022-01-22 PROCEDURE — 71045 X-RAY EXAM CHEST 1 VIEW: CPT | Mod: 26

## 2022-01-22 PROCEDURE — 70450 CT HEAD/BRAIN W/O DYE: CPT | Mod: 26

## 2022-01-22 PROCEDURE — 93010 ELECTROCARDIOGRAM REPORT: CPT

## 2022-01-22 PROCEDURE — 99220: CPT

## 2022-01-22 PROCEDURE — 99285 EMERGENCY DEPT VISIT HI MDM: CPT | Mod: FS

## 2022-01-22 PROCEDURE — 72125 CT NECK SPINE W/O DYE: CPT | Mod: 26

## 2022-01-27 DIAGNOSIS — A41.9 SEPSIS, UNSPECIFIED ORGANISM: ICD-10-CM

## 2022-01-27 DIAGNOSIS — A49.8 OTHER BACTERIAL INFECTIONS OF UNSPECIFIED SITE: ICD-10-CM

## 2022-01-27 DIAGNOSIS — S02.2XXB FRACTURE OF NASAL BONES, INITIAL ENCOUNTER FOR OPEN FRACTURE: ICD-10-CM

## 2022-01-27 DIAGNOSIS — E86.0 DEHYDRATION: ICD-10-CM

## 2022-01-27 DIAGNOSIS — F10.129 ALCOHOL ABUSE WITH INTOXICATION, UNSPECIFIED: ICD-10-CM
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
## 2022-01-27 DIAGNOSIS — N39.0 URINARY TRACT INFECTION, SITE NOT SPECIFIED: ICD-10-CM

## 2022-01-27 DIAGNOSIS — I10 ESSENTIAL (PRIMARY) HYPERTENSION: ICD-10-CM
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
## 2022-01-27 DIAGNOSIS — Z04.3 ENCOUNTER FOR EXAMINATION AND OBSERVATION FOLLOWING OTHER ACCIDENT: ICD-10-CM

## 2022-01-27 DIAGNOSIS — Z87.891 PERSONAL HISTORY OF NICOTINE DEPENDENCE: ICD-10-CM
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
## 2022-01-27 DIAGNOSIS — A41.51 SEPSIS DUE TO ESCHERICHIA COLI [E. COLI]: ICD-10-CM

## 2022-01-28 DIAGNOSIS — B96.89 OTHER SPECIFIED BACTERIAL AGENTS AS THE CAUSE OF DISEASES CLASSIFIED ELSEWHERE: ICD-10-CM

## 2022-01-28 DIAGNOSIS — A49.8 OTHER BACTERIAL INFECTIONS OF UNSPECIFIED SITE: ICD-10-CM

## 2022-01-28 DIAGNOSIS — R78.81 BACTEREMIA: ICD-10-CM

## 2022-01-28 DIAGNOSIS — N41.9 INFLAMMATORY DISEASE OF PROSTATE, UNSPECIFIED: ICD-10-CM

## 2022-01-28 DIAGNOSIS — N41.2 ABSCESS OF PROSTATE: ICD-10-CM

## 2022-02-10 DIAGNOSIS — A49.8 OTHER BACTERIAL INFECTIONS OF UNSPECIFIED SITE: ICD-10-CM

## 2022-02-10 DIAGNOSIS — B96.89 OTHER SPECIFIED BACTERIAL AGENTS AS THE CAUSE OF DISEASES CLASSIFIED ELSEWHERE: ICD-10-CM

## 2022-03-07 ENCOUNTER — APPOINTMENT (OUTPATIENT)
Dept: ULTRASOUND IMAGING | Facility: CLINIC | Age: 67
End: 2022-03-07
Payer: MEDICARE

## 2022-03-07 PROCEDURE — 76536 US EXAM OF HEAD AND NECK: CPT

## 2022-04-27 ENCOUNTER — RESULT CHARGE (OUTPATIENT)
Age: 67
End: 2022-04-27

## 2022-04-28 ENCOUNTER — APPOINTMENT (OUTPATIENT)
Dept: CARDIOLOGY | Facility: CLINIC | Age: 67
End: 2022-04-28
Payer: MEDICARE

## 2022-04-28 VITALS
HEART RATE: 59 BPM | BODY MASS INDEX: 33.15 KG/M2 | DIASTOLIC BLOOD PRESSURE: 82 MMHG | SYSTOLIC BLOOD PRESSURE: 132 MMHG | TEMPERATURE: 97.8 F | WEIGHT: 218 LBS | OXYGEN SATURATION: 95 %

## 2022-04-28 PROCEDURE — 99215 OFFICE O/P EST HI 40 MIN: CPT

## 2022-04-28 PROCEDURE — 93000 ELECTROCARDIOGRAM COMPLETE: CPT

## 2022-06-06 ENCOUNTER — APPOINTMENT (OUTPATIENT)
Dept: CARDIOLOGY | Facility: CLINIC | Age: 67
End: 2022-06-06

## 2022-06-09 ENCOUNTER — APPOINTMENT (OUTPATIENT)
Dept: CARDIOLOGY | Facility: CLINIC | Age: 67
End: 2022-06-09

## 2022-07-13 ENCOUNTER — APPOINTMENT (OUTPATIENT)
Dept: UROLOGY | Facility: CLINIC | Age: 67
End: 2022-07-13

## 2022-07-13 VITALS
HEART RATE: 84 BPM | HEIGHT: 68 IN | SYSTOLIC BLOOD PRESSURE: 176 MMHG | DIASTOLIC BLOOD PRESSURE: 76 MMHG | TEMPERATURE: 97.5 F

## 2022-07-13 LAB
BILIRUB UR QL STRIP: NEGATIVE
CLARITY UR: CLEAR
COLLECTION METHOD: NORMAL
GLUCOSE UR-MCNC: 500
HCG UR QL: 0.2 EU/DL
HGB UR QL STRIP.AUTO: NORMAL
KETONES UR-MCNC: NEGATIVE
LEUKOCYTE ESTERASE UR QL STRIP: NEGATIVE
NITRITE UR QL STRIP: NEGATIVE
PH UR STRIP: 5.5
PROT UR STRIP-MCNC: NEGATIVE
SP GR UR STRIP: 1.03

## 2022-07-13 PROCEDURE — 81003 URINALYSIS AUTO W/O SCOPE: CPT | Mod: QW

## 2022-07-13 PROCEDURE — 99204 OFFICE O/P NEW MOD 45 MIN: CPT

## 2022-07-13 NOTE — ASSESSMENT
[FreeTextEntry1] : BPH LUTS, mostly urgency and frequency\par on dual med tx\par r/o element of OAB/ r/o incomplete emptying\par also has ED\par \par Plan:\par stop finasteride\par continue tamsulosin\par start cialis 5 mg daily\par start oxybutinin R 10 mg daily\par do PSA F/T\par do US KB PVR PS

## 2022-07-13 NOTE — PHYSICAL EXAM
[General Appearance - Well Developed] : well developed [Edema] : no peripheral edema [Abdomen Soft] : soft [Urinary Bladder Findings] : the bladder was normal on palpation [FreeTextEntry1] : JOSEMANUEL: smooth 4x4 [Normal Station and Gait] : the gait and station were normal for the patient's age [No Focal Deficits] : no focal deficits [Oriented To Time, Place, And Person] : oriented to person, place, and time

## 2022-07-13 NOTE — HISTORY OF PRESENT ILLNESS
[FreeTextEntry1] : 66 yo male  patient with a pertinent past medical history of CAD, GERD,HTN, ROSARIO, obesity who presents to clinic today with complaints of LUTS: \par \par urgency and frequency is the most bothersome symptom.  \par Wakes up  1x /night to use bathroom. \par Quality of his stream is  strong and continuous\par  \par Bowel Movements are regular. Denies history of colonic diverticulum/diverticulitis.  \par No  surgical Hx including Uroflow, UDS, Cystoscopy. Denies past UTI Hx, denies fever, chills, sweats, flank pain.  Denies history of kidney stones or bladder stones.  No Neurologic Hx.  No Hx of DM \par \par IPSS: 14 /mostly driven by frequency and urgency -patient already on tamsulosin and finasteride\par ALBA=18\par smoker: 20 years\par Fam Hx: neg for PCa\par

## 2022-07-13 NOTE — REVIEW OF SYSTEMS
[Wake up at night to urinate  How many times?  ___] : wakes up to urinate [unfilled] times during the night [Negative] : Heme/Lymph [FreeTextEntry2] : has urgency and frequency and dribbling after urination\par

## 2022-07-14 LAB
APPEARANCE: CLEAR
BILIRUBIN URINE: NEGATIVE
BLOOD URINE: NEGATIVE
COLOR: NORMAL
GLUCOSE QUALITATIVE U: ABNORMAL
KETONES URINE: NEGATIVE
LEUKOCYTE ESTERASE URINE: NEGATIVE
NITRITE URINE: NEGATIVE
PH URINE: 5.5
PROTEIN URINE: NORMAL
SPECIFIC GRAVITY URINE: 1.03
UROBILINOGEN URINE: NORMAL

## 2022-07-19 ENCOUNTER — APPOINTMENT (OUTPATIENT)
Dept: ULTRASOUND IMAGING | Facility: CLINIC | Age: 67
End: 2022-07-19

## 2022-07-19 PROCEDURE — 76770 US EXAM ABDO BACK WALL COMP: CPT

## 2022-08-02 NOTE — PHYSICAL EXAM
[General Appearance - Well Developed] : well developed [Abdomen Soft] : soft [Urinary Bladder Findings] : the bladder was normal on palpation [Edema] : no peripheral edema [Oriented To Time, Place, And Person] : oriented to person, place, and time [Normal Station and Gait] : the gait and station were normal for the patient's age [No Focal Deficits] : no focal deficits

## 2022-08-03 ENCOUNTER — APPOINTMENT (OUTPATIENT)
Dept: UROLOGY | Facility: CLINIC | Age: 67
End: 2022-08-03

## 2022-08-03 VITALS
SYSTOLIC BLOOD PRESSURE: 148 MMHG | DIASTOLIC BLOOD PRESSURE: 88 MMHG | HEIGHT: 68 IN | TEMPERATURE: 96.8 F | OXYGEN SATURATION: 97 % | WEIGHT: 217 LBS | HEART RATE: 73 BPM | BODY MASS INDEX: 32.89 KG/M2

## 2022-08-03 PROCEDURE — 99214 OFFICE O/P EST MOD 30 MIN: CPT

## 2022-08-03 NOTE — HISTORY OF PRESENT ILLNESS
[FreeTextEntry1] : URO HX: \par 66 yo male  patient with a pertinent past medical history of CAD, GERD,HTN, ROSARIO, obesity who presents to clinic today with complaints of LUTS: \par \par urgency and frequency is the most bothersome symptom.  \par Wakes up 1x /night to use bathroom. \par Quality of his stream is strong and continuous\par  \par Bowel Movements are regular. Denies history of colonic diverticulum/diverticulitis.  \par No  surgical Hx including Uroflow, UDS, Cystoscopy. Denies past UTI Hx, denies fever, chills, sweats, flank pain. Hx of T2DM - on Janumet. Denies history of kidney stones or bladder stones.  No Neurologic Hx.  \par \par IPSS: 14 /mostly driven by frequency and urgency -patient already on tamsulosin and finasteride\par ALBA=18\par smoker: 20 years\par Fam Hx: neg for PCa\par \par FU August 3, 2022: \par PSA 2.8, free % 17.5.\par UA with > 1000 glucose at last visit, otherwise nl. \par US KB: PVR 56ml, Prostate Size 82cc, 1.7cm RIGHT renal cyst (listed wrong in impression). \par Pt reports improvement in frequency with oxybutinin/cialis/flomax, but reports urinary urgency is still present. \par \par \par

## 2022-08-03 NOTE — ASSESSMENT
[FreeTextEntry1] : BPH LUTS, mostly urgency and frequency\par on dual med tx\par r/o element of OAB/ r/o incomplete emptying\par also has ED\par \par FU August 3, 2022: \par PSA 2.8, free % 17.5.\par UA with > 1000 glucose at last visit, otherwise nl. Discussed glucose can be contributing to urinary symptoms. Recommend following up with PCP/endocrinologist for optimization of T2DM. \par \par US KB: PVR 56ml, large Prostate Size 82cc, 1.7cm RIGHT renal cyst (listed wrong in impression). \par Pt reports improvement in frequency with oxybutinin/cialis/flomax, but reports urinary urgency is still present. \par \par \par Plan:\par -continue tamsulosin\par -continue cialis 5 mg daily\par -continue oxybutinin R 10 mg daily as pt has only been on the medication for 2-3 weeks. \par - follow up with PCP regarding glucose in urine/optimization of medical management of T2DM. \par - follow up in 1 month to assess symptoms\par - discussed if symptoms remain, can discuss surgical management with TURP, as pt has large prostate volume. \par

## 2022-09-21 ENCOUNTER — NON-APPOINTMENT (OUTPATIENT)
Age: 67
End: 2022-09-21

## 2022-09-26 ENCOUNTER — NON-APPOINTMENT (OUTPATIENT)
Age: 67
End: 2022-09-26

## 2022-10-04 NOTE — PHYSICAL EXAM
[General Appearance - Well Developed] : well developed [Abdomen Soft] : soft [Edema] : no peripheral edema [Oriented To Time, Place, And Person] : oriented to person, place, and time [Normal Station and Gait] : the gait and station were normal for the patient's age [No Focal Deficits] : no focal deficits

## 2022-10-05 ENCOUNTER — APPOINTMENT (OUTPATIENT)
Dept: UROLOGY | Facility: CLINIC | Age: 67
End: 2022-10-05

## 2022-10-05 VITALS
BODY MASS INDEX: 32.58 KG/M2 | HEIGHT: 68 IN | TEMPERATURE: 97.3 F | HEART RATE: 70 BPM | SYSTOLIC BLOOD PRESSURE: 133 MMHG | WEIGHT: 215 LBS | DIASTOLIC BLOOD PRESSURE: 82 MMHG

## 2022-10-05 PROCEDURE — 99214 OFFICE O/P EST MOD 30 MIN: CPT

## 2022-10-05 NOTE — HISTORY OF PRESENT ILLNESS
[FreeTextEntry1] : URO HX: 66 yo male  patient with a pertinent past medical history of CAD, GERD,HTN, ROSARIO, obesity who presents to clinic today with complaints of LUTS: \par \par urgency and frequency is the most bothersome symptom.  \par Wakes up 1x /night to use bathroom. \par Quality of his stream is strong and continuous\par  \par Bowel Movements are regular. Denies history of colonic diverticulum/diverticulitis.  \par No  surgical Hx including Uroflow, UDS, Cystoscopy. Denies past UTI Hx, denies fever, chills, sweats, flank pain. Hx of T2DM - on Janumet. Denies history of kidney stones or bladder stones.  No Neurologic Hx.  \par \par IPSS: 14 /mostly driven by frequency and urgency -patient already on tamsulosin and finasteride\par ALBA=18\par smoker: 20 years\par Fam Hx: neg for PCa\par \par FU August 3, 2022: \par PSA 2.8, free % 17.5.\par UA with > 1000 glucose at last visit, otherwise nl. \par US KB: PVR 56ml, Prostate Size 82cc, 1.7cm RIGHT renal cyst (listed wrong in impression). \par Pt reports improvement in frequency with oxybutinin/cialis/flomax, but reports urinary urgency is still present. \par \par FU Oct 5, 2022: much better LUTS except urgency -still has urgency. minimal frequency. improvement in IPSS to 9. says the oxybut worked. he eats spicy food, drinks coffee, tea, sodas, some beer.\par \par \par \par

## 2022-10-05 NOTE — LETTER BODY
[Dear  ___] : Dear  [unfilled], [Consult Letter:] : I had the pleasure of evaluating your patient, [unfilled]. [Please see my note below.] : Please see my note below. [Consult Closing:] : Thank you very much for allowing me to participate in the care of this patient.  If you have any questions, please do not hesitate to contact me. [Sincerely,] : Sincerely, [FreeTextEntry3] : Jaleel Guadalupe MD\par Urologic Oncology\par [facility] Endoscopic urology\par [facility] at [place]\par [facility]\par

## 2022-10-05 NOTE — ASSESSMENT
[FreeTextEntry1] : BPH LUTS, mostly urgency and frequency\par on dual med tx\par r/o element of OAB/ r/o incomplete emptying\par also has ED\par FU August 3, 2022: \par PSA 2.8, free % 17.5.\par UA with > 1000 glucose at last visit, otherwise nl. Discussed glucose can be contributing to urinary symptoms. Recommend following up with PCP/endocrinologist for optimization of T2DM. \par \par US KB: PVR 56ml, large Prostate Size 82cc, 1.7cm RIGHT renal cyst (listed wrong in impression). \par Pt reports improvement in frequency with oxybutinin/cialis/flomax, but reports urinary urgency is still present. \par \par FU Oct 5, 2022: much better LUTS except urgency -still has urgency. minimal frequency. improvement in IPSS to 9. says the oxybut worked. he eats spicy food, drinks coffee, tea, sodas, some beer.\par \par Plan:\par Most importantly diet modifications: cut down on coffee tea sodas, diet coke, beer and spicy food.\par -continue tamsulosin\par -continue cialis 5 mg daily\par -continue oxybutinin R 10 mg daily as pt has only been on the medication for 2-3 weeks. \par - follow up with PCP regarding glucose in urine/optimization of medical management of T2DM. \par - discussed if symptoms remain, can discuss surgical management with TURP, as pt has large prostate volume. \par

## 2022-10-12 ENCOUNTER — APPOINTMENT (OUTPATIENT)
Dept: CARDIOLOGY | Facility: CLINIC | Age: 67
End: 2022-10-12

## 2022-10-12 VITALS
WEIGHT: 216 LBS | HEART RATE: 66 BPM | DIASTOLIC BLOOD PRESSURE: 84 MMHG | SYSTOLIC BLOOD PRESSURE: 130 MMHG | OXYGEN SATURATION: 98 % | BODY MASS INDEX: 32.84 KG/M2

## 2022-10-12 DIAGNOSIS — N32.81 OVERACTIVE BLADDER: ICD-10-CM

## 2022-10-12 PROCEDURE — 99214 OFFICE O/P EST MOD 30 MIN: CPT

## 2022-10-21 ENCOUNTER — RX RENEWAL (OUTPATIENT)
Age: 67
End: 2022-10-21

## 2022-12-12 ENCOUNTER — APPOINTMENT (OUTPATIENT)
Dept: UROLOGY | Facility: CLINIC | Age: 67
End: 2022-12-12

## 2022-12-12 VITALS
HEIGHT: 68 IN | OXYGEN SATURATION: 98 % | SYSTOLIC BLOOD PRESSURE: 151 MMHG | TEMPERATURE: 96.6 F | HEART RATE: 77 BPM | BODY MASS INDEX: 31.83 KG/M2 | DIASTOLIC BLOOD PRESSURE: 83 MMHG | WEIGHT: 210 LBS

## 2022-12-12 PROCEDURE — 99214 OFFICE O/P EST MOD 30 MIN: CPT

## 2022-12-12 RX ORDER — METOPROLOL SUCCINATE 100 MG/1
100 TABLET, EXTENDED RELEASE ORAL DAILY
Refills: 3 | Status: DISCONTINUED | COMMUNITY
End: 2022-12-12

## 2022-12-12 NOTE — LETTER BODY
[Dear  ___] : Dear  [unfilled], [Consult Letter:] : I had the pleasure of evaluating your patient, [unfilled]. [Please see my note below.] : Please see my note below. [Consult Closing:] : Thank you very much for allowing me to participate in the care of this patient.  If you have any questions, please do not hesitate to contact me. [Sincerely,] : Sincerely, [FreeTextEntry3] : Jaleel Guadalupe MD\par Urologic Oncology\par Rob[facility] Endoscopic urology\par [facility] at [place]\par [facility]\par

## 2022-12-12 NOTE — ASSESSMENT
[FreeTextEntry1] : BPH LUTS, mostly urgency and frequency\par on dual med tx\par r/o element of OAB/ r/o incomplete emptying\par also has ED\par FU August 3, 2022: PSA 2.8, free % 17.5. UA with > 1000 glucose at last visit, otherwise nl. Discussed glucose can be contributing to urinary symptoms. Recommend following up with PCP/endocrinologist for optimization of T2DM. \par US KB: PVR 56ml, large Prostate Size 82cc, 1.7cm RIGHT renal cyst (listed wrong in impression). \par Pt reports improvement in frequency with oxybutinin/cialis/flomax, but reports urinary urgency is still present. 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 /to note last PSA=2.8 \par \par Plan:\par refer to vascular surgery Dr Neri re: aneurism common iliac\par reassure re: CT findings of prostate: BPH, low PSA\par will continue monitoring PSA Q6 months \par Most importantly diet modifications: cut down on coffee tea sodas, diet coke, beer and spicy food.\par -continue tamsulosin\par -continue cialis 5 mg daily\par -continue oxybutinin R 10 mg daily as pt has only been on the medication for 2-3 weeks. \par \par

## 2022-12-12 NOTE — HISTORY OF PRESENT ILLNESS
[FreeTextEntry1] : URO HX: 66 yo male  patient with a pertinent past medical history of CAD, GERD,HTN, ROSARIO, obesity who presents to clinic today with complaints of LUTS: urgency and frequency is the most bothersome symptom.  Wakes up 1x /night to use bathroom.  Quality of his stream is strong and continuous\par  \par No  surgical Hx.   Hx of T2DM - on Janumet. Denies history of kidney stones or bladder stones.  No Neurologic Hx.   IPSS: 14 /mostly driven by frequency and urgency -patient already on tamsulosin and finasteride\par ALBA=18\par smoker: 20 years / Fam Hx: neg for PCa\par \par FU August 3, 2022: PSA 2.8, free % 17.5.\par UA with > 1000 glucose at last visit, otherwise nl. \par US KB: PVR 56ml, Prostate Size 82cc, 1.7cm RIGHT renal cyst (listed wrong in impression). \par Pt reports improvement in frequency with oxybutinin/cialis/flomax, but reports urinary urgency is still present. \par \par FU Oct 5, 2022: much better LUTS except urgency -still has urgency. minimal frequency. improvement in IPSS to 9. says the oxybut worked. 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 /to note last PSA=2.8 \par \par \par

## 2022-12-14 LAB
APPEARANCE: CLEAR
BILIRUBIN URINE: NEGATIVE
BLOOD URINE: NEGATIVE
COLOR: NORMAL
GLUCOSE QUALITATIVE U: ABNORMAL
KETONES URINE: NEGATIVE
LEUKOCYTE ESTERASE URINE: NEGATIVE
NITRITE URINE: NEGATIVE
PH URINE: 5.5
PROTEIN URINE: NEGATIVE
SPECIFIC GRAVITY URINE: 1.03
UROBILINOGEN URINE: NORMAL

## 2023-01-09 ENCOUNTER — RX RENEWAL (OUTPATIENT)
Age: 68
End: 2023-01-09

## 2023-03-22 ENCOUNTER — APPOINTMENT (OUTPATIENT)
Dept: UROLOGY | Facility: CLINIC | Age: 68
End: 2023-03-22
Payer: MEDICARE

## 2023-03-22 VITALS
SYSTOLIC BLOOD PRESSURE: 150 MMHG | WEIGHT: 210 LBS | OXYGEN SATURATION: 98 % | HEART RATE: 76 BPM | RESPIRATION RATE: 15 BRPM | HEIGHT: 68 IN | TEMPERATURE: 97.1 F | BODY MASS INDEX: 31.83 KG/M2 | DIASTOLIC BLOOD PRESSURE: 89 MMHG

## 2023-03-22 PROCEDURE — 99214 OFFICE O/P EST MOD 30 MIN: CPT

## 2023-03-22 NOTE — LETTER BODY
[Dear  ___] : Dear  [unfilled], [Consult Letter:] : I had the pleasure of evaluating your patient, [unfilled]. [Please see my note below.] : Please see my note below. [Consult Closing:] : Thank you very much for allowing me to participate in the care of this patient.  If you have any questions, please do not hesitate to contact me. [Sincerely,] : Sincerely, [FreeTextEntry3] : Jaleel Guadalupe MD\par Urologic Oncology\par Rob[facility] Endoscopic urology\par [facility]rology at [place]\par [facility]\par

## 2023-03-22 NOTE — ASSESSMENT
[FreeTextEntry1] : BPH LUTS, mostly urgency and frequency\par on dual med tx\par r/o element of OAB/ r/o incomplete emptying\par also has ED\par FU August 3, 2022: PSA 2.8, free % 17.5. UA with > 1000 glucose at last visit, otherwise nl. Discussed glucose can be contributing to urinary symptoms. Recommend following up with PCP/endocrinologist for optimization of T2DM. \par US KB: PVR 56ml, large Prostate Size 82cc, 1.7cm RIGHT renal cyst (listed wrong in impression). \par Pt reports improvement in frequency with oxybutinin/cialis/flomax, but reports urinary urgency is still present. 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 /to note last PSA=2.8 \par March 2023: urgency remains. but frequency improved. PSA=5.2 /%free=22.7% - \par \par Plan:\par UA reflex\par yet to see vascular surgery Dr Neri re: aneurism common iliac\par Do Bactrim DS x 5 days\par Motrin x 5 days\par Repeat PSA F/T to r/o false positive elevation in PSA\par RTC 2 weeks\par -continue tamsulosin\par -continue cialis 5 mg daily\par -continue oxybutinin R 10 mg daily as pt has only been on the medication for 2-3 weeks. \par \par

## 2023-03-22 NOTE — HISTORY OF PRESENT ILLNESS
[FreeTextEntry1] : URO HX: 68 yo male  patient with a pertinent past medical history of CAD, GERD,HTN, ROSARIO, obesity who presents to clinic today with complaints of LUTS: urgency and frequency is the most bothersome symptom.  Wakes up 1x /night to use bathroom.  Quality of his stream is strong and continuous\par  \par No  surgical Hx.   Hx of T2DM - on Janumet. Denies history of kidney stones or bladder stones.  No Neurologic Hx.   IPSS: 14 /mostly driven by frequency and urgency -patient already on tamsulosin and finasteride\par ALBA=18\par smoker: 20 years / Fam Hx: neg for PCa\par \par FU August 3, 2022: PSA 2.8, free % 17.5.\par UA with > 1000 glucose at last visit, otherwise nl. \par US KB: PVR 56ml, Prostate Size 82cc, 1.7cm RIGHT renal cyst (listed wrong in impression). \par Pt reports improvement in frequency with oxybutinin/cialis/flomax, but reports urinary urgency is still present. \par \par FU Oct 5, 2022: much better LUTS except urgency -still has urgency. minimal frequency. improvement in IPSS to 9. says the oxybut worked. 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 but frequency improved. PSA=5.2 /%free=22.7% - \par \par \par

## 2023-03-23 ENCOUNTER — NON-APPOINTMENT (OUTPATIENT)
Age: 68
End: 2023-03-23

## 2023-03-23 ENCOUNTER — APPOINTMENT (OUTPATIENT)
Dept: CARDIOLOGY | Facility: CLINIC | Age: 68
End: 2023-03-23
Payer: MEDICARE

## 2023-03-23 VITALS
WEIGHT: 218 LBS | HEART RATE: 88 BPM | OXYGEN SATURATION: 95 % | DIASTOLIC BLOOD PRESSURE: 84 MMHG | TEMPERATURE: 97.8 F | BODY MASS INDEX: 33.15 KG/M2 | SYSTOLIC BLOOD PRESSURE: 124 MMHG

## 2023-03-23 DIAGNOSIS — R42 DIZZINESS AND GIDDINESS: ICD-10-CM

## 2023-03-23 DIAGNOSIS — N52.01 ERECTILE DYSFUNCTION DUE TO ARTERIAL INSUFFICIENCY: ICD-10-CM

## 2023-03-23 DIAGNOSIS — R97.20 ELEVATED PROSTATE, SPECIFIC ANTIGEN [PSA]: ICD-10-CM

## 2023-03-23 DIAGNOSIS — K21.9 GASTRO-ESOPHAGEAL REFLUX DISEASE W/OUT ESOPHAGITIS: ICD-10-CM

## 2023-03-23 DIAGNOSIS — E66.9 OBESITY, UNSPECIFIED: ICD-10-CM

## 2023-03-23 DIAGNOSIS — G47.33 OBSTRUCTIVE SLEEP APNEA (ADULT) (PEDIATRIC): ICD-10-CM

## 2023-03-23 LAB
APPEARANCE: CLEAR
BILIRUBIN URINE: NEGATIVE
BLOOD URINE: NEGATIVE
COLOR: NORMAL
GLUCOSE QUALITATIVE U: ABNORMAL
KETONES URINE: NEGATIVE
LEUKOCYTE ESTERASE URINE: NEGATIVE
NITRITE URINE: NEGATIVE
PH URINE: 6
PROTEIN URINE: NEGATIVE
SPECIFIC GRAVITY URINE: 1.03
UROBILINOGEN URINE: NORMAL

## 2023-03-23 PROCEDURE — 93000 ELECTROCARDIOGRAM COMPLETE: CPT

## 2023-03-23 PROCEDURE — 99214 OFFICE O/P EST MOD 30 MIN: CPT

## 2023-04-05 ENCOUNTER — APPOINTMENT (OUTPATIENT)
Dept: UROLOGY | Facility: CLINIC | Age: 68
End: 2023-04-05
Payer: MEDICARE

## 2023-04-05 VITALS
BODY MASS INDEX: 31.83 KG/M2 | HEART RATE: 73 BPM | WEIGHT: 210 LBS | TEMPERATURE: 96.4 F | OXYGEN SATURATION: 98 % | HEIGHT: 68 IN | SYSTOLIC BLOOD PRESSURE: 147 MMHG | DIASTOLIC BLOOD PRESSURE: 84 MMHG

## 2023-04-05 PROCEDURE — 99214 OFFICE O/P EST MOD 30 MIN: CPT

## 2023-04-05 NOTE — HISTORY OF PRESENT ILLNESS
[FreeTextEntry1] : URO HX: 68 yo male  patient with a pertinent past medical history of CAD, GERD,HTN, ROSARIO, obesity who presents to clinic today with complaints of LUTS: urgency and frequency is the most bothersome symptom.  Wakes up 1x / night to use bathroom.  Quality of his stream is strong and continuous\par  \par No  surgical Hx.   Hx of T2DM - on Janumet. Denies history of kidney stones or bladder stones.  No Neurologic Hx.   IPSS: 14 /mostly driven by frequency and urgency -patient already on tamsulosin and finasteride\par ALBA=18 // smoker: 20 years / Fam Hx: neg for PCa\par \par FU August 3, 2022: PSA 2.8, free % 17.5.\par UA with > 1000 glucose at last visit, otherwise nl. \par US KB: PVR 56ml, Prostate Size 82cc, 1.7cm RIGHT renal cyst (listed wrong in impression). \par Pt reports improvement in frequency with oxybutinin/cialis/flomax, but reports urinary urgency is still present. \par \par FU Oct 5, 2022: much better LUTS except urgency -still has urgency. minimal frequency. improvement in IPSS to 9. says the oxybut worked. 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 but frequency improved. PSA=5.2 /%free=22.7% \par April 2023: took Bactrim + Motrin - repeated PSA: 2.4 - note: last pelvis MRI done (2021): 2.8 x 2.1 cm cystic BPH nodule corresponding to suspected abscess on prior CT. PS=86g (in 2021). PSAD=0.02 <<< 0.15\par \par

## 2023-04-05 NOTE — ASSESSMENT
[FreeTextEntry1] : BPH LUTS, mostly urgency and frequency\par on dual med tx\par r/o element of OAB/ r/o incomplete emptying\par also has ED\par FU August 3, 2022: PSA 2.8, free % 17.5. UA with > 1000 glucose at last visit, otherwise nl. Discussed glucose can be contributing to urinary symptoms. Recommend following up with PCP/endocrinologist for optimization of T2DM. \par US KB: PVR 56ml, large Prostate Size 82cc, 1.7cm RIGHT renal cyst (listed wrong in impression). \par Pt reports improvement in frequency with oxybutinin/cialis/flomax, but reports urinary urgency is still present. 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 /to note last PSA=2.8 \par March 2023: urgency remains. but frequency improved. PSA=5.2 /%free=22.7% - \par April 2023: took Bactrim + Motrin - repeated PSA: 2.4 - note: last pelvis MRI done (2021): 2.8 x 2.1 cm cystic BPH nodule corresponding to suspected abscess on prior CT. PS=86g (in 2021). PSAD=0.02 <<< 0.15\par \par Plan:\par Robotic Simple Prostatectomy\par \par Details regarding indications, risks, and benefits of the procedure were thoroughly explained to the patient.\par All images and labs were reviewed and explained thoroughly\par All the patient's questions were answered to the best of my ability.\par \par \par

## 2023-04-24 ENCOUNTER — RX RENEWAL (OUTPATIENT)
Age: 68
End: 2023-04-24

## 2023-05-22 ENCOUNTER — LABORATORY RESULT (OUTPATIENT)
Age: 68
End: 2023-05-22

## 2023-05-22 ENCOUNTER — APPOINTMENT (OUTPATIENT)
Dept: UROLOGY | Facility: CLINIC | Age: 68
End: 2023-05-22
Payer: MEDICARE

## 2023-05-22 ENCOUNTER — RESULT CHARGE (OUTPATIENT)
Age: 68
End: 2023-05-22

## 2023-05-22 VITALS
HEIGHT: 68 IN | SYSTOLIC BLOOD PRESSURE: 162 MMHG | TEMPERATURE: 97.2 F | DIASTOLIC BLOOD PRESSURE: 95 MMHG | WEIGHT: 210 LBS | HEART RATE: 76 BPM | BODY MASS INDEX: 31.83 KG/M2 | OXYGEN SATURATION: 97 %

## 2023-05-22 PROCEDURE — 99214 OFFICE O/P EST MOD 30 MIN: CPT

## 2023-05-22 NOTE — HISTORY OF PRESENT ILLNESS
[FreeTextEntry1] : URO HX: 67 yo male  patient with a pertinent past medical history of CAD, GERD,HTN, ROSARIO, obesity who presents to clinic today with complaints of LUTS: urgency and frequency is the most bothersome symptom.  Wakes up 1x / night to use bathroom.  Quality of his stream is strong and continuous\par  \par No  surgical Hx.   Hx of T2DM - on Janumet. Denies history of kidney stones or bladder stones.  No Neurologic Hx.   IPSS: 14 /mostly driven by frequency and urgency -patient already on tamsulosin and finasteride\par ALBA=18 // smoker: 20 years / Fam Hx: neg for PCa\par \par FU August 3, 2022: PSA 2.8, free % 17.5.\par UA with > 1000 glucose at last visit, otherwise nl. \par US KB: PVR 56ml, Prostate Size 82cc, 1.7cm RIGHT renal cyst (listed wrong in impression). \par Pt reports improvement in frequency with oxybutinin/cialis/flomax, but reports urinary urgency is still present. \par \par FU Oct 5, 2022: much better LUTS except urgency -still has urgency. minimal frequency. improvement in IPSS to 9. says the oxybut worked. 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 but frequency improved. PSA=5.2 /%free=22.7% \par April 2023: took Bactrim + Motrin - repeated PSA: 2.4 - note: last pelvis MRI done (2021): 2.8 x 2.1 cm cystic BPH nodule corresponding to suspected abscess on prior CT. PS=86g (in 2021). PSAD=0.02 <<< 0.15\par May 2023: has UTI sx, feeling lethargic, freq in urination.\par

## 2023-05-22 NOTE — ASSESSMENT
[FreeTextEntry1] : BPH LUTS, mostly urgency and frequency\par on dual med tx\par r/o element of OAB/ r/o incomplete emptying\par also has ED\par FU August 3, 2022: PSA 2.8, free % 17.5. UA with > 1000 glucose at last visit, otherwise nl. Discussed glucose can be contributing to urinary symptoms. Recommend following up with PCP/endocrinologist for optimization of T2DM. \par US KB: PVR 56ml, large Prostate Size 82cc, 1.7cm RIGHT renal cyst (listed wrong in impression). \par Pt reports improvement in frequency with oxybutinin/cialis/flomax, but reports urinary urgency is still present. 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 /to note last PSA=2.8 \par March 2023: urgency remains. but frequency improved. PSA=5.2 /%free=22.7% - \par April 2023: took Bactrim + Motrin - repeated PSA: 2.4 - note: last pelvis MRI done (2021): 2.8 x 2.1 cm cystic BPH nodule corresponding to suspected abscess on prior CT. PS=86g (in 2021). PSAD=0.02 <<< 0.15\par \par Plan:\par cipro 500 BID x 7 days\par US pelvis\par rtc 2 weeks\par \par Robotic Simple Prostatectomy\par \par Details regarding indications, risks, and benefits of the procedure were thoroughly explained to the patient.\par All images and labs were reviewed and explained thoroughly\par All the patient's questions were answered to the best of my ability.\par \par \par

## 2023-05-23 ENCOUNTER — APPOINTMENT (OUTPATIENT)
Dept: ULTRASOUND IMAGING | Facility: CLINIC | Age: 68
End: 2023-05-23
Payer: MEDICARE

## 2023-05-23 LAB
APPEARANCE: CLEAR
BILIRUB UR QL STRIP: NEGATIVE
BILIRUBIN URINE: NEGATIVE
BLOOD URINE: ABNORMAL
CLARITY UR: CLEAR
COLLECTION METHOD: NORMAL
COLOR: YELLOW
GLUCOSE QUALITATIVE U: >=1000 MG/DL
GLUCOSE UR-MCNC: 1000
HCG UR QL: 0.2 EU/DL
HGB UR QL STRIP.AUTO: NORMAL
KETONES UR-MCNC: NEGATIVE
KETONES URINE: NEGATIVE MG/DL
LEUKOCYTE ESTERASE UR QL STRIP: NEGATIVE
LEUKOCYTE ESTERASE URINE: NEGATIVE
NITRITE UR QL STRIP: NEGATIVE
NITRITE URINE: NEGATIVE
PH UR STRIP: 5
PH URINE: 5.5
PROT UR STRIP-MCNC: NEGATIVE
PROTEIN URINE: NORMAL MG/DL
SP GR UR STRIP: 1.02
SPECIFIC GRAVITY URINE: 1.03
UROBILINOGEN URINE: 0.2 MG/DL

## 2023-05-23 PROCEDURE — 76857 US EXAM PELVIC LIMITED: CPT

## 2023-06-05 ENCOUNTER — APPOINTMENT (OUTPATIENT)
Dept: UROLOGY | Facility: CLINIC | Age: 68
End: 2023-06-05
Payer: MEDICARE

## 2023-06-05 VITALS
HEIGHT: 68 IN | BODY MASS INDEX: 31.83 KG/M2 | SYSTOLIC BLOOD PRESSURE: 154 MMHG | WEIGHT: 210 LBS | DIASTOLIC BLOOD PRESSURE: 94 MMHG | OXYGEN SATURATION: 98 % | TEMPERATURE: 97 F | HEART RATE: 76 BPM

## 2023-06-05 PROCEDURE — 99213 OFFICE O/P EST LOW 20 MIN: CPT

## 2023-06-05 RX ORDER — CIPROFLOXACIN HYDROCHLORIDE 500 MG/1
500 TABLET, FILM COATED ORAL
Qty: 14 | Refills: 0 | Status: DISCONTINUED | COMMUNITY
Start: 2023-05-22 | End: 2023-06-05

## 2023-06-05 NOTE — ASSESSMENT
[FreeTextEntry1] : BPH LUTS, mostly urgency and frequency\par on dual med tx\par r/o element of OAB/ r/o incomplete emptying\par also has ED\par FU August 3, 2022: PSA 2.8, free % 17.5. UA with > 1000 glucose at last visit, otherwise nl. Discussed glucose can be contributing to urinary symptoms. Recommend following up with PCP/endocrinologist for optimization of T2DM. \par US KB: PVR 56ml, large Prostate Size 82cc, 1.7cm RIGHT renal cyst (listed wrong in impression). \par Pt reports improvement in frequency with oxybutinin/cialis/flomax, but reports urinary urgency is still present. 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 /to note last PSA=2.8 \par March 2023: urgency remains. but frequency improved. PSA=5.2 /%free=22.7% - \par April 2023: took Bactrim + Motrin - repeated PSA: 2.4 - note: last pelvis MRI done (2021): 2.8 x 2.1 cm cystic BPH nodule corresponding to suspected abscess on prior CT. PS=86g (in 2021). PSAD=0.02 <<< 0.15\par June 2023: PS=72g / PVR = 82cc / sx after cipro: better / still on cialis, flomax, finasteride.\par \par \par Plan:\par cystoscopy to assess BPH / median/lateral lobes\par  will discuss RASP after that\par \par \par \par

## 2023-06-05 NOTE — HISTORY OF PRESENT ILLNESS
[FreeTextEntry1] : URO HX: 67 yo male  patient with a pertinent past medical history of CAD, GERD,HTN, ROSARIO, obesity who presents to clinic today with complaints of LUTS: urgency and frequency is the most bothersome symptom.  Wakes up 1x / night to use bathroom.  \par  \par IPSS: 14 /mostly driven by frequency and urgency -patient already on tamsulosin and finasteride\par ALBA=18 // smoker: 20 years / Fam Hx: neg for PCa\par \par FU August 3, 2022: PSA 2.8, free % 17.5.\par UA with > 1000 glucose at last visit, otherwise nl. \par US KB: PVR 56ml, Prostate Size 82cc, 1.7cm RIGHT renal cyst (listed wrong in impression). \par Pt reports improvement in frequency with oxybutinin/cialis/flomax, but reports urinary urgency is still present. \par \par FU Oct 5, 2022: much better LUTS except urgency -still has urgency. minimal frequency. improvement in IPSS to 9. says the oxybut worked. 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 but frequency improved. PSA=5.2 /%free=22.7% \par April 2023: took Bactrim + Motrin - repeated PSA: 2.4 - note: last pelvis MRI done (2021): 2.8 x 2.1 cm cystic BPH nodule corresponding to suspected abscess on prior CT. PS=86g (in 2021). PSAD=0.02 <<< 0.15\par May 2023: has UTI sx, feeling lethargic, freq in urination. \par June 2023: PS=72g / PVR = 82cc / sx after cipro: better / still on cialis, flomax, finasteride.\par

## 2023-06-28 ENCOUNTER — APPOINTMENT (OUTPATIENT)
Dept: UROLOGY | Facility: CLINIC | Age: 68
End: 2023-06-28

## 2023-09-21 ENCOUNTER — APPOINTMENT (OUTPATIENT)
Dept: INFECTIOUS DISEASE | Facility: CLINIC | Age: 68
End: 2023-09-21
Payer: MEDICARE

## 2023-09-21 ENCOUNTER — APPOINTMENT (OUTPATIENT)
Dept: CARDIOLOGY | Facility: CLINIC | Age: 68
End: 2023-09-21

## 2023-09-21 VITALS
HEIGHT: 68 IN | WEIGHT: 210 LBS | OXYGEN SATURATION: 94 % | HEART RATE: 88 BPM | DIASTOLIC BLOOD PRESSURE: 90 MMHG | SYSTOLIC BLOOD PRESSURE: 128 MMHG | BODY MASS INDEX: 31.83 KG/M2 | TEMPERATURE: 97.9 F

## 2023-09-21 PROCEDURE — 99214 OFFICE O/P EST MOD 30 MIN: CPT

## 2023-09-22 ENCOUNTER — NON-APPOINTMENT (OUTPATIENT)
Age: 68
End: 2023-09-22

## 2023-09-27 ENCOUNTER — APPOINTMENT (OUTPATIENT)
Dept: UROLOGY | Facility: CLINIC | Age: 68
End: 2023-09-27
Payer: MEDICARE

## 2023-09-27 VITALS
TEMPERATURE: 97.3 F | DIASTOLIC BLOOD PRESSURE: 81 MMHG | SYSTOLIC BLOOD PRESSURE: 131 MMHG | OXYGEN SATURATION: 97 % | RESPIRATION RATE: 15 BRPM | WEIGHT: 210 LBS | BODY MASS INDEX: 31.83 KG/M2 | HEART RATE: 66 BPM | HEIGHT: 68 IN

## 2023-09-27 LAB
BILIRUB UR QL STRIP: NEGATIVE
CLARITY UR: CLEAR
COLLECTION METHOD: NORMAL
GLUCOSE UR-MCNC: 500
HCG UR QL: 0.2 EU/DL
HGB UR QL STRIP.AUTO: NORMAL
KETONES UR-MCNC: NEGATIVE
LEUKOCYTE ESTERASE UR QL STRIP: NEGATIVE
NITRITE UR QL STRIP: NEGATIVE
PH UR STRIP: 5
PROT UR STRIP-MCNC: NEGATIVE
SP GR UR STRIP: 1.02

## 2023-09-27 PROCEDURE — 81002 URINALYSIS NONAUTO W/O SCOPE: CPT

## 2023-09-27 PROCEDURE — 99214 OFFICE O/P EST MOD 30 MIN: CPT

## 2023-09-28 LAB
APPEARANCE: CLEAR
BILIRUBIN URINE: NEGATIVE
BLOOD URINE: NEGATIVE
COLOR: YELLOW
GLUCOSE QUALITATIVE U: >=1000 MG/DL
KETONES URINE: NEGATIVE MG/DL
LEUKOCYTE ESTERASE URINE: NEGATIVE
NITRITE URINE: NEGATIVE
PH URINE: 5.5
PROTEIN URINE: NEGATIVE MG/DL
SPECIFIC GRAVITY URINE: >1.03
UROBILINOGEN URINE: 0.2 MG/DL

## 2023-10-09 ENCOUNTER — RX RENEWAL (OUTPATIENT)
Age: 68
End: 2023-10-09

## 2023-10-10 ENCOUNTER — APPOINTMENT (OUTPATIENT)
Dept: CARDIOLOGY | Facility: CLINIC | Age: 68
End: 2023-10-10
Payer: MEDICARE

## 2023-10-10 ENCOUNTER — NON-APPOINTMENT (OUTPATIENT)
Age: 68
End: 2023-10-10

## 2023-10-10 VITALS
HEART RATE: 97 BPM | WEIGHT: 210 LBS | SYSTOLIC BLOOD PRESSURE: 132 MMHG | HEIGHT: 68 IN | OXYGEN SATURATION: 98 % | DIASTOLIC BLOOD PRESSURE: 82 MMHG | BODY MASS INDEX: 31.83 KG/M2

## 2023-10-10 DIAGNOSIS — I25.10 ATHEROSCLEROTIC HEART DISEASE OF NATIVE CORONARY ARTERY W/OUT ANGINA PECTORIS: ICD-10-CM

## 2023-10-10 DIAGNOSIS — I10 ESSENTIAL (PRIMARY) HYPERTENSION: ICD-10-CM

## 2023-10-10 DIAGNOSIS — E78.5 HYPERLIPIDEMIA, UNSPECIFIED: ICD-10-CM

## 2023-10-10 PROCEDURE — 99214 OFFICE O/P EST MOD 30 MIN: CPT

## 2023-10-10 PROCEDURE — 93000 ELECTROCARDIOGRAM COMPLETE: CPT

## 2023-10-10 RX ORDER — OMEGA-3-ACID ETHYL ESTERS CAPSULES 1 G/1
1 CAPSULE, LIQUID FILLED ORAL
Qty: 60 | Refills: 0 | Status: DISCONTINUED | COMMUNITY
Start: 2018-04-30 | End: 2023-10-10

## 2023-10-31 ENCOUNTER — APPOINTMENT (OUTPATIENT)
Dept: UROLOGY | Facility: HOSPITAL | Age: 68
End: 2023-10-31

## 2023-12-27 LAB — HBA1C MFR BLD HPLC: 7.8

## 2024-01-26 ENCOUNTER — RX RENEWAL (OUTPATIENT)
Age: 69
End: 2024-01-26

## 2024-04-04 ENCOUNTER — APPOINTMENT (OUTPATIENT)
Dept: INFECTIOUS DISEASE | Facility: CLINIC | Age: 69
End: 2024-04-04
Payer: MEDICARE

## 2024-04-04 VITALS
WEIGHT: 213 LBS | HEIGHT: 68 IN | HEART RATE: 87 BPM | DIASTOLIC BLOOD PRESSURE: 88 MMHG | SYSTOLIC BLOOD PRESSURE: 148 MMHG | OXYGEN SATURATION: 98 % | TEMPERATURE: 97.8 F | BODY MASS INDEX: 32.28 KG/M2

## 2024-04-04 DIAGNOSIS — Z86.19 PERSONAL HISTORY OF OTHER INFECTIOUS AND PARASITIC DISEASES: ICD-10-CM

## 2024-04-04 DIAGNOSIS — N45.1 EPIDIDYMITIS: ICD-10-CM

## 2024-04-04 PROCEDURE — 99214 OFFICE O/P EST MOD 30 MIN: CPT

## 2024-04-05 NOTE — ADDENDUM
[FreeTextEntry1] : Addendum:  Medical records were obtained from the hospital that he was admitted to By the office.  Patient was admitted on February 14, 2024 to [facility].  Per the discharge summary, he had fevers chills and cloudy urine starting on that same day.  He was found with Klebsiella in the urine culture.  Of note, he underwent aqua ablation of the prostate on November 10, 2023.  ESBL E. coli was identified in his urine culture..  Per the note he was supposed to continue the meropenem until February 21, 2024.  A CT scan of the abdomen pelvis on February 15 showed enlarged heterogeneous, lobular prostate with adjacent inflammatory changes concerning for prostatitis.  His labs also showed a PSA 11.2. Because of the concern for chronic prostatitis, he was prescribed a 4-week course of IV antibiotics. He was seen by Dr. Jaspal Hahn, infectious disease doctor.  A urine culture showed ESBL E. coli, susceptible to fosfomycin, gentamicin, nitrofurantoin, Bactrim, cefoxitin, piperacillin/tazobactam, meropenem.  It had intermediate susceptibility to cefepime. He was discharged on February 23, 2024.

## 2024-04-05 NOTE — ASSESSMENT
[FreeTextEntry1] : This  68 y.o. man  with history of right sided epididymitis ESBL E coli recently admitted to the hospital for presumed complicated UTI  per report, had ESBL E coli infection.  duration of therapy prescribed by his doctors suggestive of prostatitis.   I have recommended for patient:  Continue to take bactrim BID as prescribed.   I have asked patient to sign release of info for labs from [facility]  He should return in 1 month,.

## 2024-04-05 NOTE — PHYSICAL EXAM
[General Appearance - Alert] : alert [General Appearance - In No Acute Distress] : in no acute distress [Sclera] : the sclera and conjunctiva were normal [PERRL With Normal Accommodation] : pupils were equal in size, round, reactive to light [Extraocular Movements] : extraocular movements were intact [Outer Ear] : the ears and nose were normal in appearance [Oropharynx] : the oropharynx was normal with no thrush [Neck Cervical Mass (___cm)] : no neck mass was observed [Neck Appearance] : the appearance of the neck was normal [Jugular Venous Distention Increased] : there was no jugular-venous distention [Thyroid Diffuse Enlargement] : the thyroid was not enlarged [Auscultation Breath Sounds / Voice Sounds] : lungs were clear to auscultation bilaterally [Heart Sounds] : normal S1 and S2 [Heart Rate And Rhythm] : heart rate was normal and rhythm regular [Heart Sounds Gallop] : no gallops [Murmurs] : no murmurs [Heart Sounds Pericardial Friction Rub] : no pericardial rub [Full Pulse] : the pedal pulses are present [Edema] : there was no peripheral edema [Bowel Sounds] : normal bowel sounds [Abdomen Soft] : soft [Abdomen Tenderness] : non-tender [Abdomen Mass (___ Cm)] : no abdominal mass palpated [Costovertebral Angle Tenderness] : no CVA tenderness [No Palpable Adenopathy] : no palpable adenopathy [Nail Clubbing] : no clubbing  or cyanosis of the fingernails [Musculoskeletal - Swelling] : no joint swelling [Motor Tone] : muscle strength and tone were normal [Skin Color & Pigmentation] : normal skin color and pigmentation [] : no rash [Cranial Nerves] : cranial nerves 2-12 were intact [Sensation] : the sensory exam was normal to light touch and pinprick [No Focal Deficits] : no focal deficits [Affect] : the affect was normal [Oriented To Time, Place, And Person] : oriented to person, place, and time [FreeTextEntry1] : no suprapubic tenderness noted. No flank pain noted.

## 2024-04-05 NOTE — HISTORY OF PRESENT ILLNESS
[FreeTextEntry1] : This 68 y.o. man previously seen at the office for right sided epididymitis with ESBL E coli in urine cultures.  he completed a course of IV ertapenem.   HE tells me that he was visiting [facility], near [place] and was hospitalized   circa [place] day 2/14/24.  He had UTI, and was on ertapenem. Then he developed allergic reaction. His antibiotics were changed to Meropenem, and spent 10 days in hospital.  He then spent 18 days at home, getting IV antibiotics.  He was also given 30 days of Bactrim BID.  NO records are present with the patient for review.   he feels generally well. no fevers. urinating w/o issues

## 2024-04-17 ENCOUNTER — RESULT CHARGE (OUTPATIENT)
Age: 69
End: 2024-04-17

## 2024-04-18 ENCOUNTER — APPOINTMENT (OUTPATIENT)
Dept: CARDIOLOGY | Facility: CLINIC | Age: 69
End: 2024-04-18
Payer: MEDICARE

## 2024-04-18 VITALS
HEART RATE: 81 BPM | OXYGEN SATURATION: 97 % | HEIGHT: 68 IN | WEIGHT: 210 LBS | DIASTOLIC BLOOD PRESSURE: 78 MMHG | BODY MASS INDEX: 31.83 KG/M2 | SYSTOLIC BLOOD PRESSURE: 120 MMHG

## 2024-04-18 PROCEDURE — 93000 ELECTROCARDIOGRAM COMPLETE: CPT

## 2024-04-18 PROCEDURE — 99214 OFFICE O/P EST MOD 30 MIN: CPT

## 2024-04-18 RX ORDER — ATORVASTATIN CALCIUM 40 MG/1
40 TABLET, FILM COATED ORAL
Qty: 90 | Refills: 3 | Status: ACTIVE | COMMUNITY
Start: 1900-01-01 | End: 1900-01-01

## 2024-04-18 RX ORDER — TADALAFIL 5 MG/1
5 TABLET ORAL
Qty: 30 | Refills: 4 | Status: DISCONTINUED | COMMUNITY
Start: 2022-07-13 | End: 2024-04-18

## 2024-04-18 RX ORDER — PANTOPRAZOLE 40 MG/1
40 TABLET, DELAYED RELEASE ORAL DAILY
Qty: 30 | Refills: 3 | Status: ACTIVE | COMMUNITY

## 2024-04-18 RX ORDER — METOPROLOL SUCCINATE 50 MG/1
50 TABLET, EXTENDED RELEASE ORAL DAILY
Qty: 90 | Refills: 1 | Status: ACTIVE | COMMUNITY
Start: 2023-01-17 | End: 1900-01-01

## 2024-04-18 RX ORDER — LISINOPRIL 5 MG/1
5 TABLET ORAL DAILY
Qty: 30 | Refills: 6 | Status: ACTIVE | COMMUNITY
Start: 1900-01-01 | End: 1900-01-01

## 2024-04-18 RX ORDER — TAMSULOSIN HYDROCHLORIDE 0.4 MG/1
0.4 CAPSULE ORAL
Qty: 30 | Refills: 1 | Status: ACTIVE | COMMUNITY

## 2024-05-09 ENCOUNTER — APPOINTMENT (OUTPATIENT)
Dept: INFECTIOUS DISEASE | Facility: CLINIC | Age: 69
End: 2024-05-09
Payer: COMMERCIAL

## 2024-05-09 VITALS
HEIGHT: 68 IN | WEIGHT: 217 LBS | TEMPERATURE: 97.6 F | HEART RATE: 86 BPM | OXYGEN SATURATION: 97 % | SYSTOLIC BLOOD PRESSURE: 130 MMHG | BODY MASS INDEX: 32.89 KG/M2 | DIASTOLIC BLOOD PRESSURE: 82 MMHG

## 2024-05-09 DIAGNOSIS — N13.8 BENIGN PROSTATIC HYPERPLASIA WITH LOWER URINARY TRACT SYMPMS: ICD-10-CM

## 2024-05-09 DIAGNOSIS — R53.83 OTHER FATIGUE: ICD-10-CM

## 2024-05-09 DIAGNOSIS — E11.9 TYPE 2 DIABETES MELLITUS W/OUT COMPLICATIONS: ICD-10-CM

## 2024-05-09 DIAGNOSIS — F17.210 NICOTINE DEPENDENCE, CIGARETTES, UNCOMPLICATED: ICD-10-CM

## 2024-05-09 DIAGNOSIS — D64.9 ANEMIA, UNSPECIFIED: ICD-10-CM

## 2024-05-09 DIAGNOSIS — Z87.440 PERSONAL HISTORY OF URINARY (TRACT) INFECTIONS: ICD-10-CM

## 2024-05-09 DIAGNOSIS — N40.1 BENIGN PROSTATIC HYPERPLASIA WITH LOWER URINARY TRACT SYMPMS: ICD-10-CM

## 2024-05-09 PROCEDURE — 99214 OFFICE O/P EST MOD 30 MIN: CPT

## 2024-05-09 RX ORDER — FENOFIBRATE 145 MG/1
145 TABLET, COATED ORAL DAILY
Qty: 90 | Refills: 3 | Status: COMPLETED | COMMUNITY
End: 2024-05-09

## 2024-05-09 RX ORDER — OXYBUTYNIN CHLORIDE 10 MG/1
10 TABLET, EXTENDED RELEASE ORAL
Qty: 30 | Refills: 3 | Status: COMPLETED | COMMUNITY
Start: 2022-12-12 | End: 2024-05-09

## 2024-05-09 RX ORDER — DAPAGLIFLOZIN 10 MG/1
10 TABLET, FILM COATED ORAL DAILY
Refills: 0 | Status: COMPLETED | COMMUNITY
End: 2024-05-09

## 2024-05-09 RX ORDER — UBIDECARENONE/VIT E ACET 100MG-5
CAPSULE ORAL
Refills: 0 | Status: DISCONTINUED | COMMUNITY
End: 2024-05-09

## 2024-05-09 RX ORDER — METFORMIN HYDROCHLORIDE 500 MG/1
500 TABLET, COATED ORAL DAILY
Refills: 0 | Status: ACTIVE | COMMUNITY

## 2024-05-09 RX ORDER — OMEGA-3/DHA/EPA/FISH OIL 300-1000MG
1000 CAPSULE ORAL
Refills: 0 | Status: ACTIVE | COMMUNITY

## 2024-05-09 NOTE — ASSESSMENT
[FreeTextEntry1] : This  69  y.o. man  with history of right sided epididymitis ESBL E coli admitted to the hospital for presumed complicated UTI, February 2024  per report, had ESBL E coli infection.  duration of therapy prescribed by his doctors suggestive of prostatitis.  fully treated.  here for concerns of fatigue. ? Anemia - will check CBC and CMP   Assessment and Plan: - Obtain recent lab results from Dr. Wallace, including CBC and inflammatory markers. - Consider sleep study to evaluate for sleep apnea as a potential cause of fatigue. - Colonoscopy scheduled on the 17th of the current month to evaluate for potential iron deficiency anemia. -  on smoking cessation. - Diabetes - continue current management.  - Follow-up with lab results and further management as needed.

## 2024-05-09 NOTE — PHYSICAL EXAM
[General Appearance - Alert] : alert [General Appearance - In No Acute Distress] : in no acute distress [Sclera] : the sclera and conjunctiva were normal [PERRL With Normal Accommodation] : pupils were equal in size, round, reactive to light [Extraocular Movements] : extraocular movements were intact [Outer Ear] : the ears and nose were normal in appearance [Oropharynx] : the oropharynx was normal with no thrush [Neck Appearance] : the appearance of the neck was normal [Neck Cervical Mass (___cm)] : no neck mass was observed [Jugular Venous Distention Increased] : there was no jugular-venous distention [Thyroid Diffuse Enlargement] : the thyroid was not enlarged [Auscultation Breath Sounds / Voice Sounds] : lungs were clear to auscultation bilaterally [Heart Rate And Rhythm] : heart rate was normal and rhythm regular [Heart Sounds] : normal S1 and S2 [Heart Sounds Gallop] : no gallops [Murmurs] : no murmurs [Heart Sounds Pericardial Friction Rub] : no pericardial rub [Full Pulse] : the pedal pulses are present [Edema] : there was no peripheral edema [Bowel Sounds] : normal bowel sounds [Abdomen Soft] : soft [Abdomen Tenderness] : non-tender [Abdomen Mass (___ Cm)] : no abdominal mass palpated [Costovertebral Angle Tenderness] : no CVA tenderness [FreeTextEntry1] : no suprapubic tenderness noted. No flank pain noted.  [No Palpable Adenopathy] : no palpable adenopathy [Musculoskeletal - Swelling] : no joint swelling [Nail Clubbing] : no clubbing  or cyanosis of the fingernails [Motor Tone] : muscle strength and tone were normal [Skin Color & Pigmentation] : normal skin color and pigmentation [] : no rash [Cranial Nerves] : cranial nerves 2-12 were intact [Sensation] : the sensory exam was normal to light touch and pinprick [No Focal Deficits] : no focal deficits [Oriented To Time, Place, And Person] : oriented to person, place, and time [Affect] : the affect was normal

## 2024-05-09 NOTE — HISTORY OF PRESENT ILLNESS
[FreeTextEntry1] : 69-year-old male with a history of recurrent UTIs, enlarged prostate, hyperlipidemia, hypertension, and diabetes. - In February 2024, he was hospitalized for sepsis secondary to a UTI and was treated with Meropenem for 18 days and Bactrim for 30 days. - He underwent prostate aquablation in November 2023 and experienced post-procedural bleeding, which resolved. - He reports feeling fatigued and weak, especially around 3 PM, which he self-diagnosed as "Post-Sepsis Syndrome" on [employer]. - He denies any signs of active infection.  he feels very tired, wiped out by 3 PM.  no sick contacts.  still smokes cigarettes.    Past Medical History: - Recurrent UTIs - Enlarged prostate - Hyperlipidemia - Hypertension - Diabetes - Prostate aquablation in November 2023  Medications: - Aspirin - CoQ10 (not on current list) - Lisinopril - Metoprolol - Metformin - Fish oil   Social History: - Occasional cigarette or cigar smoking (about a pack per week) - No recent travel

## 2024-07-09 ENCOUNTER — RX RENEWAL (OUTPATIENT)
Age: 69
End: 2024-07-09

## 2024-11-26 DIAGNOSIS — R30.0 DYSURIA: ICD-10-CM

## 2024-11-26 DIAGNOSIS — E11.9 TYPE 2 DIABETES MELLITUS W/OUT COMPLICATIONS: ICD-10-CM

## 2024-11-26 RX ORDER — SULFAMETHOXAZOLE AND TRIMETHOPRIM 800; 160 MG/1; MG/1
800-160 TABLET ORAL TWICE DAILY
Qty: 14 | Refills: 0 | Status: ACTIVE | COMMUNITY
Start: 2024-11-26 | End: 1900-01-01

## 2025-04-22 ENCOUNTER — APPOINTMENT (OUTPATIENT)
Dept: RADIOLOGY | Facility: CLINIC | Age: 70
End: 2025-04-22
Payer: MEDICARE

## 2025-04-22 PROCEDURE — 72202 X-RAY EXAM SI JOINTS 3/> VWS: CPT

## 2025-04-25 ENCOUNTER — NON-APPOINTMENT (OUTPATIENT)
Age: 70
End: 2025-04-25

## 2025-04-25 ENCOUNTER — APPOINTMENT (OUTPATIENT)
Dept: CARDIOLOGY | Facility: CLINIC | Age: 70
End: 2025-04-25
Payer: MEDICARE

## 2025-04-25 DIAGNOSIS — R53.83 OTHER FATIGUE: ICD-10-CM

## 2025-04-25 PROCEDURE — 99214 OFFICE O/P EST MOD 30 MIN: CPT

## 2025-04-25 PROCEDURE — 93000 ELECTROCARDIOGRAM COMPLETE: CPT

## 2025-04-30 ENCOUNTER — NON-APPOINTMENT (OUTPATIENT)
Age: 70
End: 2025-04-30

## 2025-04-30 ENCOUNTER — APPOINTMENT (OUTPATIENT)
Dept: CARDIOLOGY | Facility: CLINIC | Age: 70
End: 2025-04-30
Payer: MEDICARE

## 2025-04-30 ENCOUNTER — APPOINTMENT (OUTPATIENT)
Dept: CARDIOLOGY | Facility: CLINIC | Age: 70
End: 2025-04-30

## 2025-04-30 VITALS
BODY MASS INDEX: 32.23 KG/M2 | WEIGHT: 212 LBS | HEART RATE: 82 BPM | SYSTOLIC BLOOD PRESSURE: 130 MMHG | OXYGEN SATURATION: 95 % | DIASTOLIC BLOOD PRESSURE: 80 MMHG

## 2025-04-30 DIAGNOSIS — I25.10 ATHEROSCLEROTIC HEART DISEASE OF NATIVE CORONARY ARTERY W/OUT ANGINA PECTORIS: ICD-10-CM

## 2025-04-30 DIAGNOSIS — E78.5 HYPERLIPIDEMIA, UNSPECIFIED: ICD-10-CM

## 2025-04-30 DIAGNOSIS — G47.33 OBSTRUCTIVE SLEEP APNEA (ADULT) (PEDIATRIC): ICD-10-CM

## 2025-04-30 DIAGNOSIS — E11.9 TYPE 2 DIABETES MELLITUS W/OUT COMPLICATIONS: ICD-10-CM

## 2025-04-30 DIAGNOSIS — I10 ESSENTIAL (PRIMARY) HYPERTENSION: ICD-10-CM

## 2025-04-30 DIAGNOSIS — E66.9 OBESITY, UNSPECIFIED: ICD-10-CM

## 2025-04-30 LAB — HBA1C MFR BLD HPLC: 6.8

## 2025-04-30 PROCEDURE — 99215 OFFICE O/P EST HI 40 MIN: CPT

## 2025-04-30 PROCEDURE — G2211 COMPLEX E/M VISIT ADD ON: CPT

## 2025-04-30 RX ORDER — SEMAGLUTIDE 1.34 MG/ML
2 INJECTION, SOLUTION SUBCUTANEOUS
Refills: 0 | Status: ACTIVE | COMMUNITY

## 2025-05-14 ENCOUNTER — OUTPATIENT (OUTPATIENT)
Dept: OUTPATIENT SERVICES | Facility: HOSPITAL | Age: 70
LOS: 1 days | End: 2025-05-14

## 2025-05-14 DIAGNOSIS — G47.33 OBSTRUCTIVE SLEEP APNEA (ADULT) (PEDIATRIC): ICD-10-CM

## 2025-05-19 ENCOUNTER — APPOINTMENT (OUTPATIENT)
Dept: CARDIOLOGY | Facility: CLINIC | Age: 70
End: 2025-05-19

## 2025-05-21 ENCOUNTER — APPOINTMENT (OUTPATIENT)
Dept: CARDIOLOGY | Facility: CLINIC | Age: 70
End: 2025-05-21
Payer: MEDICARE

## 2025-05-21 ENCOUNTER — NON-APPOINTMENT (OUTPATIENT)
Age: 70
End: 2025-05-21

## 2025-05-21 PROCEDURE — A9502: CPT | Mod: JZ

## 2025-05-21 PROCEDURE — 78452 HT MUSCLE IMAGE SPECT MULT: CPT

## 2025-05-21 PROCEDURE — 93015 CV STRESS TEST SUPVJ I&R: CPT

## 2025-05-25 ENCOUNTER — OUTPATIENT (OUTPATIENT)
Dept: OUTPATIENT SERVICES | Facility: HOSPITAL | Age: 70
LOS: 1 days | End: 2025-05-25

## 2025-05-25 DIAGNOSIS — G47.33 OBSTRUCTIVE SLEEP APNEA (ADULT) (PEDIATRIC): ICD-10-CM

## 2025-05-31 ENCOUNTER — OUTPATIENT (OUTPATIENT)
Dept: OUTPATIENT SERVICES | Facility: HOSPITAL | Age: 70
LOS: 1 days | End: 2025-05-31
Payer: MEDICARE

## 2025-05-31 DIAGNOSIS — G47.33 OBSTRUCTIVE SLEEP APNEA (ADULT) (PEDIATRIC): ICD-10-CM

## 2025-05-31 PROCEDURE — 95800 SLP STDY UNATTENDED: CPT | Mod: 26

## 2025-05-31 PROCEDURE — 95800 SLP STDY UNATTENDED: CPT

## 2025-06-04 ENCOUNTER — APPOINTMENT (OUTPATIENT)
Dept: CARDIOLOGY | Facility: CLINIC | Age: 70
End: 2025-06-04
Payer: MEDICARE

## 2025-06-04 VITALS
SYSTOLIC BLOOD PRESSURE: 110 MMHG | BODY MASS INDEX: 31.17 KG/M2 | DIASTOLIC BLOOD PRESSURE: 80 MMHG | HEART RATE: 69 BPM | WEIGHT: 205 LBS | OXYGEN SATURATION: 98 %

## 2025-06-04 DIAGNOSIS — E78.5 HYPERLIPIDEMIA, UNSPECIFIED: ICD-10-CM

## 2025-06-04 DIAGNOSIS — I25.10 ATHEROSCLEROTIC HEART DISEASE OF NATIVE CORONARY ARTERY W/OUT ANGINA PECTORIS: ICD-10-CM

## 2025-06-04 DIAGNOSIS — G47.33 OBSTRUCTIVE SLEEP APNEA (ADULT) (PEDIATRIC): ICD-10-CM

## 2025-06-04 DIAGNOSIS — E66.9 OBESITY, UNSPECIFIED: ICD-10-CM

## 2025-06-04 DIAGNOSIS — I10 ESSENTIAL (PRIMARY) HYPERTENSION: ICD-10-CM

## 2025-06-04 PROCEDURE — G2211 COMPLEX E/M VISIT ADD ON: CPT

## 2025-06-04 PROCEDURE — 99214 OFFICE O/P EST MOD 30 MIN: CPT

## 2025-08-13 ENCOUNTER — OFFICE VISIT (OUTPATIENT)
Dept: URGENT CARE | Facility: CLINIC | Age: 70
End: 2025-08-13
Payer: MEDICARE